# Patient Record
Sex: MALE | Race: WHITE | NOT HISPANIC OR LATINO | Employment: OTHER | URBAN - METROPOLITAN AREA
[De-identification: names, ages, dates, MRNs, and addresses within clinical notes are randomized per-mention and may not be internally consistent; named-entity substitution may affect disease eponyms.]

---

## 2017-01-06 ENCOUNTER — PROCEDURE VISIT (OUTPATIENT)
Dept: OPHTHALMOLOGY | Facility: CLINIC | Age: 82
End: 2017-01-06
Payer: MEDICARE

## 2017-01-06 VITALS — DIASTOLIC BLOOD PRESSURE: 83 MMHG | HEART RATE: 70 BPM | SYSTOLIC BLOOD PRESSURE: 143 MMHG

## 2017-01-06 DIAGNOSIS — R06.09 DOE (DYSPNEA ON EXERTION): Primary | ICD-10-CM

## 2017-01-06 DIAGNOSIS — E78.49 OTHER HYPERLIPIDEMIA: ICD-10-CM

## 2017-01-06 DIAGNOSIS — G51.39 HEMIFACIAL SPASM: Primary | ICD-10-CM

## 2017-01-06 DIAGNOSIS — E03.8 OTHER SPECIFIED HYPOTHYROIDISM: ICD-10-CM

## 2017-01-06 DIAGNOSIS — Z12.5 SCREENING FOR PROSTATE CANCER: ICD-10-CM

## 2017-01-06 PROCEDURE — 99499 UNLISTED E&M SERVICE: CPT | Mod: S$GLB,,,

## 2017-01-06 PROCEDURE — 64612 DESTROY NERVE FACE MUSCLE: CPT | Mod: LT,S$GLB,,

## 2017-01-06 NOTE — PROGRESS NOTES
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         Assessment /Plan     For exam results, see Encounter Report.    Hemifacial spasm - Left Eye        I have reviewed the patient history,review of systems,and findings as recorded by the technician and resident and accept.  botox injected in prior pattern,avoiding lower lid to prevent strabismus. Restarted with 2.5 units to minimize complications  (may need to increase -- discussed)

## 2017-01-06 NOTE — MR AVS SNAPSHOT
[facility]  [address]  [facility] [address]  Phone: [phone]  Fax: [phone]                  Asaf TORREY Pendleton   2017 10:30 AM   Procedure visit    Description:  Male : 3/17/1924   Provider:  Narendra Walker MD   Department:  [facility]           Reason for Visit     Eye Problem           Diagnoses this Visit        Comments    Hemifacial spasm    -  Primary            To Do List           Future Appointments        Provider Department Dept Phone    2017 8:45 AM LAB, APPOINTMENT [place] [facility] [phone]    2017 9:45 AM [facility] [id number] LIMIT [facility] [phone]    2017 10:15 AM EKG, APPT [facility] - [phone]    2017 11:00 AM MD [facility] [phone]      Goals (5 Years of Data)     None      [facility] On Call     [facility] On Call Nurse Care Line -  Assistance  Registered nurses in the [facility] On Call Center provide clinical advisement, health education, appointment booking, and other advisory services.  Call for this free service at [phone].             Medications           Message regarding Medications     Verify the changes and/or additions to your medication regime listed below are the same as discussed with your clinician today.  If any of these changes or additions are incorrect, please notify your healthcare provider.        These medications were administered today        Dose Freq    onabotulinumtoxina injection 15 Units 15 Units Clinic/HOD 1 time    Sig: Inject 15 Units into the skin one time.    Class: Normal    Route: Subcutaneous           Verify that the below list of medications is an accurate representation of the medications you are currently taking.  If none reported, the list may be blank. If incorrect, please contact your healthcare provider. Carry this list with you in case of emergency.            Current Medications     albuterol (PROVENTIL) 2.5 mg /3 mL (0.083 %) nebulizer solution Take 2.5 mg by nebulization every 6 (six) hours as needed.      albuterol 90 mcg/actuation inhaler Inhale 2 puffs into the lungs every 4 (four) hours as needed for Wheezing or Shortness of Breath.    ibuprofen (ADVIL,MOTRIN) 600 MG tablet     testosterone (ANDROGEL) 20.25 mg/1.25 gram (1.62 %) GlPm USE AS DIRECTED    budesonide-formoterol 160-4.5 mcg (SYMBICORT) 160-4.5 mcg/actuation HFAA Inhale 2 puffs into the lungs 2 (two) times daily.           Clinical Reference Information           Vital Signs - Last Recorded  Most recent update: 1/6/2017 10:49 AM by Nimco Grider    BP Pulse                (!) 143/83 (BP Location: Left arm, Patient Position: Sitting, BP Method: Automatic) 70          Blood Pressure          Most Recent Value    BP  (!)  143/83      Allergies as of 1/6/2017     Codeine      Immunizations Administered on Date of Encounter - 1/6/2017     None      [facility] Sign-Up     Activating your [facility] account is as easy as 1-2-3!     1) Visit [email or web address], select Sign Up Now, enter this activation code and your date of birth, then select Next.  [email or web address]  Expires: 1/28/2017 11:36 AM      2) Create a username and password to use when you visit [facility] in the future and select a security question in case you lose your password and select Next.    3) Enter your e-mail address and click Sign Up!    Additional Information  If you have questions, please e-mail [email or web address] or call [phone] to talk to our [facility] staff. Remember, [facility] is NOT to be used for urgent needs. For medical emergencies, dial 911.         Instructions    Return when effect wears off for repeat treatment

## 2017-01-23 ENCOUNTER — CLINICAL SUPPORT (OUTPATIENT)
Dept: AUDIOLOGY | Facility: CLINIC | Age: 82
End: 2017-01-23
Payer: MEDICARE

## 2017-01-23 DIAGNOSIS — H90.3 SENSORINEURAL HEARING LOSS, BILATERAL: Primary | ICD-10-CM

## 2017-01-23 PROCEDURE — 99499 UNLISTED E&M SERVICE: CPT | Mod: S$GLB,,, | Performed by: AUDIOLOGIST

## 2017-01-23 NOTE — PROGRESS NOTES
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  He fell down on the stairs after trying to stop his dog from going outside.  When he fell, he fell on his left ear and cracked the aid.  [facility] was called and they stated the aid was not under warranty but they did have his MYRNA on file.  His broken aid will be sent back for a repair/remake.  He was quoted $500 for it to get fixed.  This will come with a one yr warranty.

## 2017-01-25 ENCOUNTER — HOSPITAL ENCOUNTER (OUTPATIENT)
Dept: CARDIOLOGY | Facility: CLINIC | Age: 82
Discharge: HOME | End: 2017-01-25
Payer: MEDICARE

## 2017-01-25 ENCOUNTER — HOSPITAL ENCOUNTER (OUTPATIENT)
Dept: RADIOLOGY | Facility: HOSPITAL | Age: 82
Discharge: HOME | End: 2017-01-25
Attending: INTERNAL MEDICINE
Payer: MEDICARE

## 2017-01-25 ENCOUNTER — OFFICE VISIT (OUTPATIENT)
Dept: PULMONOLOGY | Facility: CLINIC | Age: 82
End: 2017-01-25
Payer: MEDICARE

## 2017-01-25 VITALS
HEART RATE: 90 BPM | BODY MASS INDEX: 28.43 KG/M2 | OXYGEN SATURATION: 97 % | DIASTOLIC BLOOD PRESSURE: 72 MMHG | HEIGHT: 71 IN | WEIGHT: 203.06 LBS | SYSTOLIC BLOOD PRESSURE: 114 MMHG

## 2017-01-25 DIAGNOSIS — R06.09 DOE (DYSPNEA ON EXERTION): ICD-10-CM

## 2017-01-25 DIAGNOSIS — Z00.00 ANNUAL PHYSICAL EXAM: Primary | ICD-10-CM

## 2017-01-25 PROCEDURE — 93000 ELECTROCARDIOGRAM COMPLETE: CPT | Mod: S$GLB,,, | Performed by: INTERNAL MEDICINE

## 2017-01-25 PROCEDURE — 99499 UNLISTED E&M SERVICE: CPT | Mod: S$GLB,,, | Performed by: INTERNAL MEDICINE

## 2017-01-25 PROCEDURE — 99397 PER PM REEVAL EST PAT 65+ YR: CPT | Mod: S$GLB,,, | Performed by: INTERNAL MEDICINE

## 2017-01-25 PROCEDURE — 99999 PR PBB SHADOW E&M-EST. PATIENT-LVL III: CPT | Mod: PBBFAC,,, | Performed by: INTERNAL MEDICINE

## 2017-01-25 PROCEDURE — 71020 XR CHEST PA AND LATERAL: CPT | Mod: 26,,, | Performed by: RADIOLOGY

## 2017-01-25 NOTE — LETTER
January 26, 2017    Asaf TORREY Pendleton  [address] [facility] [address]             [facility]  [address]  [facility] [address]  Phone: [phone] Dear Claudio,      Thank you for allowing me to serve you and perform your Executive [facility] exam on 1/25/2017. This letter will serve as a brief summary of the physical findings and laboratory/studies performed and recommendations at this time.Today's assessment is essentially normal. There has been a decline in renal function consisting with aging.      I will speak to Elijah Staples regarding the need for a colonoscope.         If you have any questions or concerns, please don't hesitate to call.    Sincerely,        Asaf Aguirre MD

## 2017-01-26 NOTE — PROGRESS NOTES
Subjective:       Patient ID: Asaf Pendleton is a 89 y.o. male.    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 Went to [place] and later served in the [employer] He has a son who is a distinguished ophthalmologist at [employer]. He has chronic back pain from spinal stenosis but still functions at a high level. He has been treated by Dr. Leach for prostate cancer with no evidence of reoccurrence.   Has very mild intermittent ashtma, doing well at the moment. Has noted a slight change in his bowel habits. No melena, no abdominal discomfort and no anemia on today's labs; Has a colonoscope in December, 2008, negative. Will discuss with Dr. Staples about the need of a repeat study now.       No flowsheet data found.]  Review of Systems   Constitutional: Negative.    HENT: Positive for hearing loss.    Eyes: Negative.         Left facial tic   Respiratory: Negative.    Cardiovascular: Negative.    Genitourinary: Negative.         S/P irradiation for colon cancer.   Musculoskeletal: Positive for back pain.        Severe spinal stenosis   Skin: Negative.    Gastrointestinal: Negative.         Change in bowel habits.   Neurological: Negative.    Psychiatric/Behavioral: Negative.        Objective:      Physical Exam   Constitutional: He is oriented to person, place, and time. He appears well-developed and well-nourished.   HENT:   Head: Normocephalic and atraumatic.   Right Ear: External ear normal.   Left Ear: External ear normal.   Eyes: Conjunctivae and EOM are normal. Pupils are equal, round, and reactive to light.   Neck: Normal range of motion. Neck supple.   Cardiovascular: Normal rate, regular rhythm and normal heart sounds.    Pulmonary/Chest: Effort normal and breath sounds normal.   Clear with peak flow of 450 l/min   Abdominal: Soft. Bowel sounds are normal.   Musculoskeletal: Normal range of motion.   Neurological: He is alert and  oriented to person, place, and time. He has normal reflexes.   Skin: Skin is warm and dry.   Psychiatric: He has a normal mood and affect. His behavior is normal. Judgment and thought content normal.           Assessment:       No diagnosis found.    Outpatient Encounter Prescriptions as of 1/25/2017   Medication Sig Dispense Refill    albuterol (PROVENTIL) 2.5 mg /3 mL (0.083 %) nebulizer solution Take 2.5 mg by nebulization every 6 (six) hours as needed.        albuterol 90 mcg/actuation inhaler Inhale 2 puffs into the lungs every 4 (four) hours as needed for Wheezing or Shortness of Breath. 1 Inhaler 12    budesonide-formoterol 160-4.5 mcg (SYMBICORT) 160-4.5 mcg/actuation HFAA Inhale 2 puffs into the lungs 2 (two) times daily. 1 Inhaler 12    ibuprofen (ADVIL,MOTRIN) 600 MG tablet       testosterone (ANDROGEL) 20.25 mg/1.25 gram (1.62 %) GlPm USE AS DIRECTED 1 Bottle 4     No facility-administered encounter medications on file as of 1/25/2017.      No orders of the defined types were placed in this encounter.    Plan:       Labs: Depressed GFR and increased creatinine:1.9, all other parameters are normal. Chest x-ray is clear and EKG has intraventricular delay otherwise normal and unchanged from 2015. Remarkalble 89 old Male

## 2017-02-16 ENCOUNTER — CLINICAL SUPPORT (OUTPATIENT)
Dept: AUDIOLOGY | Facility: CLINIC | Age: 82
End: 2017-02-16
Payer: MEDICARE

## 2017-02-16 DIAGNOSIS — H90.3 SENSORINEURAL HEARING LOSS, BILATERAL: Primary | ICD-10-CM

## 2017-02-16 PROCEDURE — 99499 UNLISTED E&M SERVICE: CPT | Mod: S$GLB,,, | Performed by: AUDIOLOGIST

## 2017-02-16 NOTE — PROGRESS NOTES
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  In addition, he requested an "ambient"  Program, which he means a comfort program.  This program was placed into P2.  He now has Master and comfort and an RC dex to control volume and program adjustments.  His ear canals were clear of wax.  PRN.  "

## 2017-02-22 ENCOUNTER — TELEPHONE (OUTPATIENT)
Dept: DERMATOLOGY | Facility: CLINIC | Age: 82
End: 2017-02-22

## 2017-02-22 NOTE — TELEPHONE ENCOUNTER
----- Message from Rosalinda Brown sent at 2/22/2017  3:56 PM CST -----  Contact: pt  The pt called to get an appointment ASAP. The pt will come in with non healing lesions on his face. Please call the pt at [phone]

## 2017-03-01 ENCOUNTER — OFFICE VISIT (OUTPATIENT)
Dept: OPHTHALMOLOGY | Facility: CLINIC | Age: 82
End: 2017-03-01
Payer: MEDICARE

## 2017-03-01 DIAGNOSIS — H35.30 MACULAR DEGENERATION: Primary | ICD-10-CM

## 2017-03-01 DIAGNOSIS — H50.40 COMITANT STRABISMUS: ICD-10-CM

## 2017-03-01 DIAGNOSIS — G51.39 HEMIFACIAL SPASM: ICD-10-CM

## 2017-03-01 DIAGNOSIS — Z96.1 PSEUDOPHAKIA OF BOTH EYES: ICD-10-CM

## 2017-03-01 PROCEDURE — 92014 COMPRE OPH EXAM EST PT 1/>: CPT | Mod: S$GLB,,,

## 2017-03-01 PROCEDURE — 99999 PR PBB SHADOW E&M-EST. PATIENT-LVL II: CPT | Mod: PBBFAC,,,

## 2017-03-01 NOTE — PROGRESS NOTES
HPI     Eye Problem    Additional comments: os feels jumpy           Comments   ARMD OU  Hemifacial spasm  S/p botox injection 1/6/17       Last edited by Nimco Grider on 3/1/2017 11:33 AM. (History)            Assessment /Plan     For exam results, see Encounter Report.    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  ARMD slowly progressive -- cont supplements. Rx for new glasses

## 2017-03-01 NOTE — PATIENT INSTRUCTIONS
Astigmatism worse -- get new glasses.  Continue supplements for macular degeneration  Return for botox as needed

## 2017-03-01 NOTE — MR AVS SNAPSHOT
[facility]  [address]  [facility] [address]  Phone: [phone]  Fax: [phone]                  Asaf Pendleton   3/1/2017 10:30 AM   Office Visit    Description:  Male : 3/17/1924   Provider:  Narendra Walker MD   Department:  [facility]           Reason for Visit     Eye Problem           Diagnoses this Visit        Comments    Macular degeneration    -  Primary     Pseudophakia of both eyes         Hemifacial spasm         Comitant strabismus                To Do List           Goals (5 Years of Data)     None      [facility] On [facility]     [facility] On Call Nurse Care Line -  Assistance  Registered nurses in the [facility] On Call Center provide clinical advisement, health education, appointment booking, and other advisory services.  Call for this free service at [phone].             Medications           Message regarding Medications     Verify the changes and/or additions to your medication regime listed below are the same as discussed with your clinician today.  If any of these changes or additions are incorrect, please notify your healthcare provider.             Verify that the below list of medications is an accurate representation of the medications you are currently taking.  If none reported, the list may be blank. If incorrect, please contact your healthcare provider. Carry this list with you in case of emergency.           Current Medications     albuterol (PROVENTIL) 2.5 mg /3 mL (0.083 %) nebulizer solution Take 2.5 mg by nebulization every 6 (six) hours as needed.      albuterol 90 mcg/actuation inhaler Inhale 2 puffs into the lungs every 4 (four) hours as needed for Wheezing or Shortness of Breath.    ibuprofen (ADVIL,MOTRIN) 600 MG tablet     testosterone (ANDROGEL) 20.25 mg/1.25 gram (1.62 %) GlPm USE AS DIRECTED    budesonide-formoterol 160-4.5 mcg (SYMBICORT) 160-4.5 mcg/actuation HFAA Inhale 2 puffs into the lungs 2 (two) times daily.            Clinical Reference Information           Allergies as of 3/1/2017     Codeine      Immunizations Administered on Date of Encounter - 3/1/2017     None      Instructions    Astigmatism worse -- get new glasses.  Continue supplements for macular degeneration  Return for botox as needed       Language Assistance Services     ATTENTION: Language assistance services are available, free of charge. Please call [phone].      ATENCIÓN: Si habla yanetañol, tiene a escalante disposición servicios gratuitos de asistencia lingüística. Llame al [phone].     CHÚ Ý: N?u b?n nói Ti?ng Vi?t, có các d?ch v? h? tr? ngôn ng? mi?n phí dành cho b?n. G?i s? [phone].         [facility] complies with applicable Federal civil rights laws and does not discriminate on the basis of race, color, national origin, age, disability, or sex.

## 2017-03-02 DIAGNOSIS — R79.89 LOW TESTOSTERONE: ICD-10-CM

## 2017-03-02 RX ORDER — TESTOSTERONE 20.25 MG/1.25G
GEL TOPICAL
Qty: 1 BOTTLE | Refills: 4 | Status: SHIPPED | OUTPATIENT
Start: 2017-03-02 | End: 2017-04-28 | Stop reason: SDUPTHER

## 2017-03-07 ENCOUNTER — TELEPHONE (OUTPATIENT)
Dept: DERMATOLOGY | Facility: CLINIC | Age: 82
End: 2017-03-07

## 2017-03-07 NOTE — TELEPHONE ENCOUNTER
----- Message from Maryellen Garcia MD sent at 3/7/2017  9:52 AM CST -----  Contact: pt  You can give him 10 am appt on 3/9  ----- Message -----     From: Vandana Burden MA     Sent: 2/22/2017   4:18 PM       To: MD Dr. Jose June: Dr. Capellan stated he need to see you as soon as possible I inform to him you do not have anything in FEB but he insisted on me telling you who he is, There's no spots in March only yellow and green A's and B's.    ----- Message -----     From: Rosalinda Brown     Sent: 2/22/2017   3:56 PM       To: Jose Monroy    The pt called to get an appointment ASAP. The pt will come in with non healing lesions on his face. Please call the pt at [phone]

## 2017-03-13 ENCOUNTER — TELEPHONE (OUTPATIENT)
Dept: DERMATOLOGY | Facility: CLINIC | Age: 82
End: 2017-03-13

## 2017-03-13 NOTE — TELEPHONE ENCOUNTER
----- Message from Flores Michael sent at 3/13/2017  1:05 PM CDT -----  Contact: pt   MCR-pt-pt is returning the nurses call can you please call pt at [phone].    KEILA

## 2017-04-28 DIAGNOSIS — R79.89 LOW TESTOSTERONE: ICD-10-CM

## 2017-04-28 RX ORDER — TESTOSTERONE 20.25 MG/1.25G
GEL TOPICAL
Qty: 1 BOTTLE | Refills: 4 | Status: SHIPPED | OUTPATIENT
Start: 2017-04-28 | End: 2017-07-21 | Stop reason: SDUPTHER

## 2017-05-23 ENCOUNTER — TELEPHONE (OUTPATIENT)
Dept: DERMATOLOGY | Facility: CLINIC | Age: 82
End: 2017-05-23

## 2017-05-23 NOTE — TELEPHONE ENCOUNTER
----- Message from Vandana Simms sent at 5/22/2017  1:54 PM CDT -----  Contact: patient   [phone] call above patient need to get in as soon as possible waiting on a call from the nurse thanks

## 2017-05-26 ENCOUNTER — OFFICE VISIT (OUTPATIENT)
Dept: DERMATOLOGY | Facility: CLINIC | Age: 82
End: 2017-05-26
Payer: MEDICARE

## 2017-05-26 ENCOUNTER — TELEPHONE (OUTPATIENT)
Dept: OPHTHALMOLOGY | Facility: CLINIC | Age: 82
End: 2017-05-26

## 2017-05-26 VITALS — BODY MASS INDEX: 28.31 KG/M2 | WEIGHT: 203 LBS

## 2017-05-26 DIAGNOSIS — Z85.828 HISTORY OF SKIN CANCER: ICD-10-CM

## 2017-05-26 DIAGNOSIS — L57.0 ACTINIC KERATOSIS: Primary | ICD-10-CM

## 2017-05-26 PROCEDURE — 99999 PR PBB SHADOW E&M-EST. PATIENT-LVL II: CPT | Mod: PBBFAC,,, | Performed by: DERMATOLOGY

## 2017-05-26 PROCEDURE — 1159F MED LIST DOCD IN RCRD: CPT | Mod: S$GLB,,, | Performed by: DERMATOLOGY

## 2017-05-26 PROCEDURE — 17000 DESTRUCT PREMALG LESION: CPT | Mod: S$GLB,,, | Performed by: DERMATOLOGY

## 2017-05-26 PROCEDURE — 99213 OFFICE O/P EST LOW 20 MIN: CPT | Mod: 25,S$GLB,, | Performed by: DERMATOLOGY

## 2017-05-26 NOTE — PROGRESS NOTES
Subjective:       Patient ID:  Asaf Pendleton is a 89 y.o. male who presents for   Chief Complaint   Patient presents with    Spot     face    Skin Check     History of Present Illness: The patient presents with chief complaint of lesion.  Location: right cheek  Duration: months  Signs/Symptoms: growing     Prior treatments: none        Spot         Review of Systems   Constitutional: Negative for fever.   Skin: Negative for itching and rash.   Hematologic/Lymphatic: Does not bruise/bleed easily.        Objective:    Physical Exam   Constitutional: He appears well-developed and well-nourished. No distress.   Neurological: He is alert and oriented to person, place, and time. He is not disoriented.   Psychiatric: He has a normal mood and affect.   Skin:   Areas Examined (abnormalities noted in diagram):   Scalp / Hair Palpated and Inspected  Head / Face Inspection Performed  Neck Inspection Performed  Chest / Axilla Inspection Performed  RUE Inspected  LUE Inspection Performed                   Diagram Legend     Erythematous scaling macule/papule c/w actinic keratosis         Assessment / Plan:        Actinic keratosis   Cryosurgery Procedure Note    Verbal consent from the patient is obtained and the patient is aware of the precancerous quality and need for treatment of these lesions. Liquid nitrogen cryosurgery is applied to the 1 actinic keratoses, as detailed in the physical exam, to produce a freeze injury.      History of skin cancer  Comments:  scc forehead removed mohs surgery      Dry skin on face, use am lactin lotion qd           Return in about 6 months (around 11/26/2017).

## 2017-05-26 NOTE — TELEPHONE ENCOUNTER
Left message Mr Capellan is asleep she will have [facility] call back later today. He needs an appt. For blurred vision

## 2017-07-11 ENCOUNTER — HOSPITAL ENCOUNTER (OUTPATIENT)
Dept: RADIOLOGY | Facility: HOSPITAL | Age: 82
Discharge: HOME | End: 2017-07-11
Attending: INTERNAL MEDICINE
Payer: MEDICARE

## 2017-07-11 ENCOUNTER — OFFICE VISIT (OUTPATIENT)
Dept: PULMONOLOGY | Facility: CLINIC | Age: 82
End: 2017-07-11
Payer: MEDICARE

## 2017-07-11 VITALS
WEIGHT: 201 LBS | BODY MASS INDEX: 29.77 KG/M2 | HEIGHT: 69 IN | HEART RATE: 84 BPM | SYSTOLIC BLOOD PRESSURE: 118 MMHG | OXYGEN SATURATION: 97 % | DIASTOLIC BLOOD PRESSURE: 72 MMHG

## 2017-07-11 DIAGNOSIS — S22.31XA CLOSED FRACTURE OF RIB OF RIGHT SIDE, INITIAL ENCOUNTER: Primary | ICD-10-CM

## 2017-07-11 DIAGNOSIS — S22.31XA CLOSED FRACTURE OF RIB OF RIGHT SIDE, INITIAL ENCOUNTER: ICD-10-CM

## 2017-07-11 PROCEDURE — 99214 OFFICE O/P EST MOD 30 MIN: CPT | Mod: S$GLB,,, | Performed by: INTERNAL MEDICINE

## 2017-07-11 PROCEDURE — 99999 PR PBB SHADOW E&M-EST. PATIENT-LVL III: CPT | Mod: PBBFAC,,, | Performed by: INTERNAL MEDICINE

## 2017-07-11 PROCEDURE — 71100 X-RAY EXAM RIBS UNI 2 VIEWS: CPT | Mod: 26,,, | Performed by: RADIOLOGY

## 2017-07-11 PROCEDURE — 1159F MED LIST DOCD IN RCRD: CPT | Mod: S$GLB,,, | Performed by: INTERNAL MEDICINE

## 2017-07-11 PROCEDURE — 71100 X-RAY EXAM RIBS UNI 2 VIEWS: CPT | Mod: TC

## 2017-07-11 PROCEDURE — 1125F AMNT PAIN NOTED PAIN PRSNT: CPT | Mod: S$GLB,,, | Performed by: INTERNAL MEDICINE

## 2017-07-11 RX ORDER — HYDROCODONE BITARTRATE AND ACETAMINOPHEN 10; 325 MG/1; MG/1
TABLET ORAL
Qty: 36 TABLET | Refills: 0 | Status: SHIPPED | OUTPATIENT
Start: 2017-07-11 | End: 2017-09-26

## 2017-07-11 NOTE — PROGRESS NOTES
Subjective:       Patient ID: Asaf Pendleton is a 89 y.o. male.    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 He has exquisite tenderness over the right posterior axillary line.No hematoma or subcutaneous air.      No flowsheet data found.]  Review of Systems   Constitutional: Negative.    HENT: Negative.    Eyes: Negative.    Respiratory: Negative.    Cardiovascular: Positive for chest pain.   Genitourinary: Negative.    Musculoskeletal: Negative.    Skin: Negative.    Gastrointestinal: Negative.    Neurological: Negative.    Psychiatric/Behavioral: Negative.        Objective:      Physical Exam   Pulmonary/Chest:   Pain with palpation of the right anterior axillary line. No crepitation or ecchymoses           Assessment:       1. Closed fracture of rib of right side, initial encounter        Outpatient Encounter Prescriptions as of 7/11/2017   Medication Sig Dispense Refill    albuterol (PROVENTIL) 2.5 mg /3 mL (0.083 %) nebulizer solution Take 2.5 mg by nebulization every 6 (six) hours as needed.        albuterol 90 mcg/actuation inhaler Inhale 2 puffs into the lungs every 4 (four) hours as needed for Wheezing or Shortness of Breath. 1 Inhaler 12    budesonide-formoterol 160-4.5 mcg (SYMBICORT) 160-4.5 mcg/actuation HFAA Inhale 2 puffs into the lungs 2 (two) times daily. 1 Inhaler 12    hydrocodone-acetaminophen 10-325mg (NORCO)  mg Tab One tablet  q 4 -6 hours 36 tablet 0    ibuprofen (ADVIL,MOTRIN) 600 MG tablet       testosterone (ANDROGEL) 20.25 mg/1.25 gram (1.62 %) GlPm USE AS DIRECTED 1 Bottle 4     No facility-administered encounter medications on file as of 7/11/2017.      Orders Placed This Encounter   Procedures    X-Ray Ribs 2 View Right     Standing Status:   Future     Number of Occurrences:   1     Standing Expiration Date:   7/11/2018      Order Specific Question:   May the Radiologist modify the order per protocol to meet the clinical needs of the patient?     Answer:   Yes     Plan:            X-ray : 4 rib fractures: 6,7 ,8,9  Will give vicodin for pain management.

## 2017-07-21 DIAGNOSIS — R79.89 LOW TESTOSTERONE: ICD-10-CM

## 2017-07-21 RX ORDER — TESTOSTERONE 20.25 MG/1.25G
GEL TOPICAL
Qty: 1 BOTTLE | Refills: 4 | Status: SHIPPED | OUTPATIENT
Start: 2017-07-21 | End: 2017-09-15 | Stop reason: SDUPTHER

## 2017-08-23 ENCOUNTER — OFFICE VISIT (OUTPATIENT)
Dept: OPHTHALMOLOGY | Facility: CLINIC | Age: 82
End: 2017-08-23
Payer: MEDICARE

## 2017-08-23 DIAGNOSIS — H35.3190 MACULAR DEGENERATION, AGE RELATED, NONEXUDATIVE: ICD-10-CM

## 2017-08-23 DIAGNOSIS — H50.40 COMITANT STRABISMUS: ICD-10-CM

## 2017-08-23 DIAGNOSIS — G51.39 HEMIFACIAL SPASM: Primary | ICD-10-CM

## 2017-08-23 DIAGNOSIS — Z96.1 PSEUDOPHAKIA OF BOTH EYES: ICD-10-CM

## 2017-08-23 DIAGNOSIS — H35.30 MACULAR DEGENERATION: ICD-10-CM

## 2017-08-23 PROCEDURE — 99999 PR PBB SHADOW E&M-EST. PATIENT-LVL II: CPT | Mod: PBBFAC,,, | Performed by: OPHTHALMOLOGY

## 2017-08-23 PROCEDURE — 92014 COMPRE OPH EXAM EST PT 1/>: CPT | Mod: S$GLB,,, | Performed by: OPHTHALMOLOGY

## 2017-08-23 PROCEDURE — 92134 CPTRZ OPH DX IMG PST SGM RTA: CPT | Mod: S$GLB,,, | Performed by: OPHTHALMOLOGY

## 2017-08-23 NOTE — PROGRESS NOTES
HPI     Concerns About Ocular Health    Additional comments: Krystina Denise PT           Comments   ARMD OU  Hemifacial spasm  S/p botox injection 1/6/17    Patient states his va at near seems to be getting worse in OU since his   last visit.    No gtts       Last edited by Arben Ibarra on 8/23/2017 10:31 AM. (History)            Assessment /Plan     For exam results, see Encounter Report.    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  Patient to return sooner for changes with chart including decreasing vision and increasing metamorphopsia.     Clinic      Dry Eye Syndrome: discussed use of warm compresses, preserved & non-preserved artificial tears, gel and PM ointment options.  Also discussed options utilizing medications.    PC IOL OU  Quiet    Plan   Clinic  Retina service  Dr Brenner --> Bot clinic  Shelia 6 months IOP  Sooner prn with good understanding

## 2017-09-11 ENCOUNTER — OFFICE VISIT (OUTPATIENT)
Dept: OPTOMETRY | Facility: CLINIC | Age: 82
End: 2017-09-11
Payer: MEDICARE

## 2017-09-11 DIAGNOSIS — H54.3 LOW VISION, BOTH EYES: Primary | ICD-10-CM

## 2017-09-11 PROCEDURE — 92015 DETERMINE REFRACTIVE STATE: CPT | Mod: S$GLB,,, | Performed by: OPTOMETRIST

## 2017-09-11 PROCEDURE — 99499 UNLISTED E&M SERVICE: CPT | Mod: S$GLB,,, | Performed by: OPTOMETRIST

## 2017-09-11 PROCEDURE — 99999 PR PBB SHADOW E&M-EST. PATIENT-LVL II: CPT | Mod: PBBFAC,,, | Performed by: OPTOMETRIST

## 2017-09-11 NOTE — PROGRESS NOTES
Assessment /Plan     For exam results, see Encounter Report.    Low vision, both eyes      1. See other exam low vision same date.

## 2017-09-15 DIAGNOSIS — R79.89 LOW TESTOSTERONE: ICD-10-CM

## 2017-09-15 RX ORDER — TESTOSTERONE 20.25 MG/1.25G
GEL TOPICAL
Qty: 1 BOTTLE | Refills: 4 | Status: SHIPPED | OUTPATIENT
Start: 2017-09-15 | End: 2017-10-30 | Stop reason: SDUPTHER

## 2017-09-26 ENCOUNTER — OFFICE VISIT (OUTPATIENT)
Dept: PULMONOLOGY | Facility: CLINIC | Age: 82
End: 2017-09-26
Payer: MEDICARE

## 2017-09-26 ENCOUNTER — INITIAL CONSULT (OUTPATIENT)
Dept: OPHTHALMOLOGY | Facility: CLINIC | Age: 82
End: 2017-09-26
Payer: MEDICARE

## 2017-09-26 VITALS
OXYGEN SATURATION: 93 % | HEART RATE: 66 BPM | SYSTOLIC BLOOD PRESSURE: 126 MMHG | HEIGHT: 70 IN | BODY MASS INDEX: 29.13 KG/M2 | WEIGHT: 203.5 LBS | DIASTOLIC BLOOD PRESSURE: 72 MMHG

## 2017-09-26 DIAGNOSIS — E05.90 HYPERTHYROIDISM: ICD-10-CM

## 2017-09-26 DIAGNOSIS — D64.9 ANEMIA, UNSPECIFIED TYPE: Primary | ICD-10-CM

## 2017-09-26 DIAGNOSIS — J45.20 ASTHMA, MILD INTERMITTENT, WELL-CONTROLLED: Primary | ICD-10-CM

## 2017-09-26 DIAGNOSIS — G24.5 BLEPHAROSPASM: Primary | ICD-10-CM

## 2017-09-26 DIAGNOSIS — G51.39 HEMIFACIAL SPASM: ICD-10-CM

## 2017-09-26 DIAGNOSIS — Z96.1 PSEUDOPHAKIA OF BOTH EYES: ICD-10-CM

## 2017-09-26 DIAGNOSIS — H35.30 MACULAR DEGENERATION: ICD-10-CM

## 2017-09-26 PROCEDURE — 99214 OFFICE O/P EST MOD 30 MIN: CPT | Mod: S$GLB,,, | Performed by: INTERNAL MEDICINE

## 2017-09-26 PROCEDURE — 1126F AMNT PAIN NOTED NONE PRSNT: CPT | Mod: S$GLB,,, | Performed by: INTERNAL MEDICINE

## 2017-09-26 PROCEDURE — 99999 PR PBB SHADOW E&M-EST. PATIENT-LVL III: CPT | Mod: PBBFAC,,, | Performed by: INTERNAL MEDICINE

## 2017-09-26 PROCEDURE — 99999 PR PBB SHADOW E&M-EST. PATIENT-LVL II: CPT | Mod: PBBFAC,,, | Performed by: OPHTHALMOLOGY

## 2017-09-26 PROCEDURE — 1159F MED LIST DOCD IN RCRD: CPT | Mod: S$GLB,,, | Performed by: INTERNAL MEDICINE

## 2017-09-26 PROCEDURE — 3008F BODY MASS INDEX DOCD: CPT | Mod: S$GLB,,, | Performed by: INTERNAL MEDICINE

## 2017-09-26 PROCEDURE — 92012 INTRM OPH EXAM EST PATIENT: CPT | Mod: 25,S$GLB,, | Performed by: OPHTHALMOLOGY

## 2017-09-26 PROCEDURE — 64612 DESTROY NERVE FACE MUSCLE: CPT | Mod: LT,S$GLB,, | Performed by: OPHTHALMOLOGY

## 2017-09-26 NOTE — PROGRESS NOTES
HPI     Eye Problem    Additional comments: hemifacial spasm           Comments   Here for botox injection left side. Last injection 1/6/17 Dr Walker  Last visit with Dr Bass  Pseudophakia  ARMD dry   No drops       Last edited by Nimco Grider on 9/26/2017 11:15 AM. (History)            Assessment /Plan     For exam results, see Encounter Report.    Blepharospasm  -     Cancel: External/Slit Lamp Photography    Hemifacial spasm  -     Discontinue: onabotulinumtoxinA injection; Inject into the muscle one time.  -     Prior Authorization Order  -     onabotulinumtoxinA injection 50 Units; Inject 50 Units into the muscle one time.    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  Alternatives such as waiting were discussed.  All questions were answered.           Procedure note:     32.5 units injected, 17.5 units wasted. The patient tolerated the procedure well without any complications. See scanned note for injection pattern.       RTC 3 months     AMD followed by Dr. Bass.      I have reviewed and concur with the resident's history, physical, assessment, and plan.  I have personally interviewed and examined the patient.

## 2017-09-27 NOTE — PROGRESS NOTES
Subjective:       Patient ID: Asaf Pendleton is a 89 y.o. male.    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 He states that he is cold all of the time and even had a jacket He is doing remarkably  well, has worsening vision and can't read the small print and does not drive any more.today. Has reduced his tie in the wood shop.       No flowsheet data found.]  Review of Systems   Constitutional: Negative.    HENT: Negative.    Eyes: Negative.         Macular degeneration      botox injection for blephospasm   Respiratory: Negative.    Cardiovascular: Negative.    Genitourinary: Negative.         S/P prostate cancer   Musculoskeletal: Positive for back pain.        S/P lumbar surgery    Skin: Negative.    Gastrointestinal: Negative.    Neurological: Negative.    Psychiatric/Behavioral: Negative.        Objective:      Physical Exam   Constitutional: He is oriented to person, place, and time. He appears well-developed and well-nourished.   HENT:   Head: Normocephalic and atraumatic.   Right Ear: External ear normal.   Left Ear: External ear normal.   Eyes: Conjunctivae and EOM are normal. Pupils are equal, round, and reactive to light.   Neck: Normal range of motion. Neck supple.   Cardiovascular: Normal rate, regular rhythm and normal heart sounds.    /76    2/6 systolic murmur 2nd RICS   Pulmonary/Chest: Effort normal and breath sounds normal.   Peak flow 400 l/min without wheezes   Abdominal: Soft. Bowel sounds are normal.   Musculoskeletal: Normal range of motion.   Neurological: He is alert and oriented to person, place, and time. He has normal reflexes.   Skin: Skin is warm and dry.   Psychiatric: He has a normal mood and affect. His behavior is normal. Judgment and thought content normal.           Assessment:       No diagnosis found.    Outpatient Encounter Prescriptions as of 9/26/2017   Medication Sig  Dispense Refill    albuterol (PROVENTIL) 2.5 mg /3 mL (0.083 %) nebulizer solution Take 2.5 mg by nebulization every 6 (six) hours as needed.        albuterol 90 mcg/actuation inhaler Inhale 2 puffs into the lungs every 4 (four) hours as needed for Wheezing or Shortness of Breath. 1 Inhaler 12    budesonide-formoterol 160-4.5 mcg (SYMBICORT) 160-4.5 mcg/actuation HFAA Inhale 2 puffs into the lungs 2 (two) times daily. 1 Inhaler 12    ibuprofen (ADVIL,MOTRIN) 600 MG tablet       testosterone (ANDROGEL) 20.25 mg/1.25 gram (1.62 %) GlPm USE AS DIRECTED 1 Bottle 4    [DISCONTINUED] hydrocodone-acetaminophen 10-325mg (NORCO)  mg Tab One tablet  q 4 -6 hours 36 tablet 0     Facility-Administered Encounter Medications as of 9/26/2017   Medication Dose Route Frequency Provider Last Rate Last Dose    [COMPLETED] onabotulinumtoxinA injection 50 Units  50 Units Intramuscular 1 time in Clinic/ERIKA Brenner MD   50 Units at 09/26/17 1145    [DISCONTINUED] onabotulinumtoxinA injection   Intramuscular 1 time in Clinic/ERIKA Brenner MD         No orders of the defined types were placed in this encounter.    Plan:            CBC slightly reduced H&H with macrocytosis   TSH 6.02  And normal T-4:0.86   Euthyroid

## 2017-10-02 ENCOUNTER — INITIAL CONSULT (OUTPATIENT)
Dept: OPHTHALMOLOGY | Facility: CLINIC | Age: 82
End: 2017-10-02
Payer: MEDICARE

## 2017-10-02 VITALS — DIASTOLIC BLOOD PRESSURE: 66 MMHG | HEART RATE: 83 BPM | SYSTOLIC BLOOD PRESSURE: 115 MMHG

## 2017-10-02 DIAGNOSIS — H35.3134 NONEXUDATIVE AGE-RELATED MACULAR DEGENERATION, BILATERAL, ADVANCED ATROPHIC WITH SUBFOVEAL INVOLVEMENT: Primary | ICD-10-CM

## 2017-10-02 PROCEDURE — 92014 COMPRE OPH EXAM EST PT 1/>: CPT | Mod: 24,S$GLB,, | Performed by: OPHTHALMOLOGY

## 2017-10-02 PROCEDURE — 99999 PR PBB SHADOW E&M-EST. PATIENT-LVL III: CPT | Mod: PBBFAC,,, | Performed by: OPHTHALMOLOGY

## 2017-10-02 NOTE — LETTER
October 2, 2017      Bala Bass MD  [address]  [facility] [address]           [facility]  [address]  [facility] [address]  Phone: [phone]  Fax: [phone]          Patient: Asaf Pendleton   MR Number: [id number]   YOB: 1924   Date of Visit: 10/2/2017       Dear Dr. Bala Bass:    Thank you for referring Asaf Pendleton to me for evaluation. Attached you will find relevant portions of my assessment and plan of care.    If you have questions, please do not hesitate to call me. I look forward to following Asaf Pendleton along with you.    Sincerely,    Terrence Aj MD    Enclosure  CC:  No Recipients    If you would like to receive this communication electronically, please contact [email or web address] or [phone] to request more information on [employer] Link access.    For providers and/or their staff who would like to refer a patient to [facility], please contact us through our one-stop-shop provider referral line, [facility], at [phone].    If you feel you have received this communication in error or would no longer like to receive these types of communications, please e-mail [email or web address]

## 2017-10-02 NOTE — PROGRESS NOTES
HPI     Mac Degen consult   DLS-08/23/2017 Dr. Bass    Has noticed decline in near va within the last 6 mo to a year.  Having   difficulty reading.  No pain/redness/f/f OU.  No sudden changes or   distortions.    Still suffering with blepharospasm's OS constantly     (-)Flashes (-)Floaters  (-)Photophobia  (-)Glare    ARMD OU  Hemifacial spasm  S/p botox injections      Last edited by Terrence Aj MD on 10/2/2017  2:17 PM. (History)      Reviewed last [place] SDOCT:   OD: good quality, RPE atrophy, no fluid  OS: good quality, RPE atrophy, no fluid      Assessment /Plan     For exam results, see Encounter Report.    There are no diagnoses linked to this encounter.    Adv Dry AMD OU with GARPE  Continue AREDS 2, Amsler  Refer to [place] or Dr Bernardo again for reading aids/magnifiers    Hemifacial spasm s/p recent Botox-f/u Dr Brenner as planned    PCIOL OU-excellent result    F/u Dr. Bass as planned    RTC 6 months, sooner PRN

## 2017-10-27 ENCOUNTER — TELEPHONE (OUTPATIENT)
Dept: PULMONOLOGY | Facility: CLINIC | Age: 82
End: 2017-10-27

## 2017-10-30 DIAGNOSIS — R79.89 LOW TESTOSTERONE: ICD-10-CM

## 2017-10-30 RX ORDER — TESTOSTERONE 20.25 MG/1.25G
GEL TOPICAL
Qty: 1 BOTTLE | Refills: 4 | Status: SHIPPED | OUTPATIENT
Start: 2017-10-30 | End: 2018-01-30 | Stop reason: SDUPTHER

## 2017-12-01 ENCOUNTER — TELEPHONE (OUTPATIENT)
Dept: PULMONOLOGY | Facility: CLINIC | Age: 82
End: 2017-12-01

## 2017-12-01 NOTE — TELEPHONE ENCOUNTER
----- Message from Halley Olivo sent at 11/24/2017  2:40 PM CST -----  Contact: [phone]   Calling to refer a patient

## 2017-12-14 ENCOUNTER — CLINICAL SUPPORT (OUTPATIENT)
Dept: AUDIOLOGY | Facility: CLINIC | Age: 82
End: 2017-12-14
Payer: MEDICARE

## 2017-12-14 DIAGNOSIS — H90.3 SENSORINEURAL HEARING LOSS, BILATERAL: Primary | ICD-10-CM

## 2017-12-14 PROCEDURE — 99499 UNLISTED E&M SERVICE: CPT | Mod: S$GLB,,, | Performed by: AUDIOLOGIST

## 2017-12-14 NOTE — PROGRESS NOTES
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  It was reprogrammed with his left aid and RC dex so that everything was in synch and working together.  Dr. Pendleton seemed pleased with the volume and didn't need any adjustments.  His right vent was plugged.  NV 6 months for HA check and possible audiogram and ear cleaning.

## 2018-01-30 DIAGNOSIS — R79.89 LOW TESTOSTERONE: ICD-10-CM

## 2018-01-30 RX ORDER — TESTOSTERONE 20.25 MG/1.25G
GEL TOPICAL
Qty: 1 BOTTLE | Refills: 4 | Status: SHIPPED | OUTPATIENT
Start: 2018-01-30 | End: 2018-08-01 | Stop reason: SDUPTHER

## 2018-04-19 ENCOUNTER — PES CALL (OUTPATIENT)
Dept: ADMINISTRATIVE | Facility: CLINIC | Age: 83
End: 2018-04-19

## 2018-05-22 ENCOUNTER — OFFICE VISIT (OUTPATIENT)
Dept: DERMATOLOGY | Facility: CLINIC | Age: 83
End: 2018-05-22
Payer: MEDICARE

## 2018-05-22 VITALS — WEIGHT: 203 LBS | BODY MASS INDEX: 29.13 KG/M2

## 2018-05-22 DIAGNOSIS — Z85.828 HISTORY OF SKIN CANCER: ICD-10-CM

## 2018-05-22 DIAGNOSIS — L57.0 MULTIPLE ACTINIC KERATOSES: Primary | ICD-10-CM

## 2018-05-22 PROCEDURE — 99999 PR PBB SHADOW E&M-EST. PATIENT-LVL III: CPT | Mod: PBBFAC,,, | Performed by: DERMATOLOGY

## 2018-05-22 PROCEDURE — 17003 DESTRUCT PREMALG LES 2-14: CPT | Mod: S$GLB,,, | Performed by: DERMATOLOGY

## 2018-05-22 PROCEDURE — 17000 DESTRUCT PREMALG LESION: CPT | Mod: S$GLB,,, | Performed by: DERMATOLOGY

## 2018-05-22 PROCEDURE — 99213 OFFICE O/P EST LOW 20 MIN: CPT | Mod: 25,S$GLB,, | Performed by: DERMATOLOGY

## 2018-05-22 RX ORDER — FLUOROURACIL 50 MG/G
CREAM TOPICAL
Qty: 40 G | Refills: 3 | Status: SHIPPED | OUTPATIENT
Start: 2018-05-22

## 2018-05-22 NOTE — PROGRESS NOTES
Subjective:       Patient ID:  Asaf Pendleton is a 89 y.o. male who presents for   Chief Complaint   Patient presents with    Skin Check     nose, scalp     History of Present Illness: The patient presents with chief complaint of spots.  Location: nose and scalp  Duration: months  Signs/Symptoms: none     Prior treatments: none          Review of Systems   Constitutional: Negative for fever.   Skin: Negative for itching and rash.   Hematologic/Lymphatic: Does not bruise/bleed easily.        Objective:    Physical Exam   Constitutional: He appears well-developed and well-nourished. No distress.   Neurological: He is alert and oriented to person, place, and time. He is not disoriented.   Psychiatric: He has a normal mood and affect.   Skin:   Areas Examined (abnormalities noted in diagram):   Scalp / Hair Palpated and Inspected  Head / Face Inspection Performed  Neck Inspection Performed  Chest / Axilla Inspection Performed  RUE Inspected  LUE Inspection Performed                   Diagram Legend     Erythematous scaling macule/papule c/w actinic keratosis       Vascular papule c/w angioma      Pigmented verrucoid papule/plaque c/w seborrheic keratosis      Yellow umbilicated papule c/w sebaceous hyperplasia      Irregularly shaped tan macule c/w lentigo     1-2 mm smooth white papules consistent with Milia      Movable subcutaneous cyst with punctum c/w epidermal inclusion cyst      Subcutaneous movable cyst c/w pilar cyst      Firm pink to brown papule c/w dermatofibroma      Pedunculated fleshy papule(s) c/w skin tag(s)      Evenly pigmented macule c/w junctional nevus     Mildly variegated pigmented, slightly irregular-bordered macule c/w mildly atypical nevus      Flesh colored to evenly pigmented papule c/w intradermal nevus       Pink pearly papule/plaque c/w basal cell carcinoma      Erythematous hyperkeratotic cursted plaque c/w SCC      Surgical scar with no sign of skin cancer recurrence      Open  and closed comedones      Inflammatory papules and pustules      Verrucoid papule consistent consistent with wart     Erythematous eczematous patches and plaques     Dystrophic onycholytic nail with subungual debris c/w onychomycosis     Umbilicated papule    Erythematous-base heme-crusted tan verrucoid plaque consistent with inflamed seborrheic keratosis     Erythematous Silvery Scaling Plaque c/w Psoriasis     See annotation      Assessment / Plan:        Multiple actinic keratoses  -     fluorouracil (EFUDEX) 5 % cream; Use hs for 2 weeks  Dispense: 40 g; Refill: 3   Cryosurgery Procedure Note    Verbal consent from the patient is obtained and the patient is aware of the precancerous quality and need for treatment of these lesions. Liquid nitrogen cryosurgery is applied to the2 actinic keratoses, as detailed in the physical exam, to produce a freeze injury.      History of skin cancer  Comments:  scc forehead removed mohs surgery             Follow-up in about 3 months (around 8/22/2018).

## 2018-06-11 ENCOUNTER — TELEPHONE (OUTPATIENT)
Dept: PULMONOLOGY | Facility: CLINIC | Age: 83
End: 2018-06-11

## 2018-06-13 ENCOUNTER — TELEPHONE (OUTPATIENT)
Dept: PULMONOLOGY | Facility: CLINIC | Age: 83
End: 2018-06-13

## 2018-07-27 DIAGNOSIS — J45.20 ASTHMA, MILD INTERMITTENT, WELL-CONTROLLED: Primary | ICD-10-CM

## 2018-07-27 RX ORDER — BUDESONIDE AND FORMOTEROL FUMARATE DIHYDRATE 160; 4.5 UG/1; UG/1
2 AEROSOL RESPIRATORY (INHALATION) 2 TIMES DAILY
Qty: 1 INHALER | Refills: 12 | Status: SHIPPED | OUTPATIENT
Start: 2018-07-27 | End: 2018-08-26

## 2018-08-01 DIAGNOSIS — R79.89 LOW TESTOSTERONE: ICD-10-CM

## 2018-08-01 RX ORDER — TESTOSTERONE 20.25 MG/1.25G
GEL TOPICAL
Qty: 1 BOTTLE | Refills: 4 | Status: SHIPPED | OUTPATIENT
Start: 2018-08-01 | End: 2018-09-06 | Stop reason: SDUPTHER

## 2018-08-17 ENCOUNTER — TELEPHONE (OUTPATIENT)
Dept: AUDIOLOGY | Facility: CLINIC | Age: 83
End: 2018-08-17

## 2018-08-20 ENCOUNTER — CLINICAL SUPPORT (OUTPATIENT)
Dept: AUDIOLOGY | Facility: CLINIC | Age: 83
End: 2018-08-20
Payer: MEDICARE

## 2018-08-20 ENCOUNTER — CLINICAL SUPPORT (OUTPATIENT)
Dept: AUDIOLOGY | Facility: CLINIC | Age: 83
End: 2018-08-20

## 2018-08-20 DIAGNOSIS — H90.3 SENSORINEURAL HEARING LOSS, BILATERAL: Primary | ICD-10-CM

## 2018-08-20 PROCEDURE — 92557 COMPREHENSIVE HEARING TEST: CPT | Mod: S$GLB,,, | Performed by: AUDIOLOGIST

## 2018-08-20 NOTE — PROGRESS NOTES
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 Recommended updated [employer] technology. Discussed pros and cons of various styles and technology levels. Patient elected to proceed with [employer]tingXpert Evoke 440 Full Shell Hearing aids. Patient acknowledged understanding of the 30 day trial period, $250 restocking fee from the time of order, and the fact that we do not file insurance on behalf of the patient. Patient was advised to call or email with any questions. He will return on 9/10/2018 for his HAE.

## 2018-08-20 NOTE — PROGRESS NOTES
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  He reported that he broke his left hearing aid and was interested in updated technology, rather than spending money to fix his broken hearing aid.     Audiological testing revealed a moderate to severe SNHL, bilaterally. A speech reception threshold was obtained at 70 dBHL, bilaterally. Speech discrimination was 60%, bilaterally.    Recommendations:  1. Otologic evaluation  2. Annual hearing evaluation  3. Noise protection  4. Hearing aid consultation for updated binaural technology

## 2018-08-22 ENCOUNTER — OFFICE VISIT (OUTPATIENT)
Dept: DERMATOLOGY | Facility: CLINIC | Age: 83
End: 2018-08-22
Payer: MEDICARE

## 2018-08-22 VITALS — WEIGHT: 203 LBS | BODY MASS INDEX: 29.13 KG/M2

## 2018-08-22 DIAGNOSIS — L81.4 LENTIGINES: Primary | ICD-10-CM

## 2018-08-22 DIAGNOSIS — Z87.2 HISTORY OF ACTINIC KERATOSES: ICD-10-CM

## 2018-08-22 DIAGNOSIS — Z85.828 HISTORY OF SKIN CANCER: ICD-10-CM

## 2018-08-22 PROCEDURE — 99999 PR PBB SHADOW E&M-EST. PATIENT-LVL III: CPT | Mod: PBBFAC,,, | Performed by: DERMATOLOGY

## 2018-08-22 PROCEDURE — 99212 OFFICE O/P EST SF 10 MIN: CPT | Mod: S$GLB,,, | Performed by: DERMATOLOGY

## 2018-09-06 DIAGNOSIS — R79.89 LOW TESTOSTERONE: ICD-10-CM

## 2018-09-06 RX ORDER — TESTOSTERONE 20.25 MG/1.25G
GEL TOPICAL
Qty: 1 BOTTLE | Refills: 4 | Status: SHIPPED | OUTPATIENT
Start: 2018-09-06 | End: 2018-10-23 | Stop reason: SDUPTHER

## 2018-09-20 ENCOUNTER — CLINICAL SUPPORT (OUTPATIENT)
Dept: AUDIOLOGY | Facility: CLINIC | Age: 83
End: 2018-09-20

## 2018-09-20 DIAGNOSIS — H90.3 SENSORINEURAL HEARING LOSS, BILATERAL: Primary | ICD-10-CM

## 2018-09-20 NOTE — PROGRESS NOTES
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 Hearing aids were programmed based on his most recent audiogram and set to experienced user settings. The left vent was plugged. Feedback was run and a minor adjustment was made for feedback in the left hearing aid. Dr. 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 We discussed battery size, the different battery door, wiping down hearing aids, brushing hearing aids and changing wax filters. 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 He did not want to set up a f/u appointment at today's appointment. He was encouraged to call if he has any issues or needs any adjustments.     Hearing Aid Information:    Right ear  : Widex   Model:  Fusion 440  Type:  ITE  Color: Beige  Battery: 312  Tube/ length & power:  IP  Serial number: [id number]   Warranty expiration:  10/10/21   L and D expiration:  10/10/21      Left ear  : Widex   Model:  Fusion 440  Type:  ITE  Color: Beige  Battery: 312  Tube/ length & power:  IP  Serial number: [id number]  Warranty expiration:  10/10/21   L and D expiration:  10/10/21      : Widex   Model:  Fusion 440  Type:  ITE  Color: Beige  Battery: 312  Tube/ length & power:  IP  Serial number: [id number]   Warranty expiration:  10/10/21   L and D expiration:  10/10/21     Accessories  RC DEX  Serial number: [id number]   Warranty expiration: 10/10/19   L and D expiration:  9/10/18

## 2018-09-24 ENCOUNTER — PES CALL (OUTPATIENT)
Dept: ADMINISTRATIVE | Facility: CLINIC | Age: 83
End: 2018-09-24

## 2018-10-10 ENCOUNTER — PES CALL (OUTPATIENT)
Dept: ADMINISTRATIVE | Facility: CLINIC | Age: 83
End: 2018-10-10

## 2018-10-17 ENCOUNTER — TELEPHONE (OUTPATIENT)
Dept: AUDIOLOGY | Facility: CLINIC | Age: 83
End: 2018-10-17

## 2018-10-23 DIAGNOSIS — R79.89 LOW TESTOSTERONE: ICD-10-CM

## 2018-10-23 RX ORDER — TESTOSTERONE 20.25 MG/1.25G
GEL TOPICAL
Qty: 1 BOTTLE | Refills: 4 | Status: SHIPPED | OUTPATIENT
Start: 2018-10-23 | End: 2018-12-14 | Stop reason: SDUPTHER

## 2018-12-14 DIAGNOSIS — R79.89 LOW TESTOSTERONE: ICD-10-CM

## 2018-12-14 RX ORDER — TESTOSTERONE 20.25 MG/1.25G
GEL TOPICAL
Qty: 1 BOTTLE | Refills: 4 | Status: SHIPPED | OUTPATIENT
Start: 2018-12-14 | End: 2019-02-19 | Stop reason: SDUPTHER

## 2018-12-14 NOTE — TELEPHONE ENCOUNTER
----- Message from Vivien Kruse sent at 12/14/2018  1:05 PM CST -----  Contact:    Self    Rx Refill/Request     Is this a Refill or New Rx:   Refill     Rx Name and Strength:     testosterone (ANDROGEL) 20.25 mg/1.25 gram (1.62 %) GlPm  Preferred Pharmacy with phone number:     [facility]    [address] AT [facility] [address] [phone]    Communication Preference:   Phone     [phone]  Additional Information:

## 2019-01-02 ENCOUNTER — OFFICE VISIT (OUTPATIENT)
Dept: OPHTHALMOLOGY | Facility: CLINIC | Age: 84
End: 2019-01-02
Payer: MEDICARE

## 2019-01-02 DIAGNOSIS — Z96.1 PSEUDOPHAKIA OF BOTH EYES: ICD-10-CM

## 2019-01-02 DIAGNOSIS — H35.3134 ADVANCED ATROPHIC NONEXUDATIVE AGE-RELATED MACULAR DEGENERATION OF BOTH EYES WITH SUBFOVEAL INVOLVEMENT: Primary | ICD-10-CM

## 2019-01-02 DIAGNOSIS — G51.39 HEMIFACIAL SPASM: ICD-10-CM

## 2019-01-02 DIAGNOSIS — H54.10 BLINDNESS AND LOW VISION: ICD-10-CM

## 2019-01-02 PROCEDURE — 99999 PR PBB SHADOW E&M-EST. PATIENT-LVL II: ICD-10-PCS | Mod: PBBFAC,HCNC,, | Performed by: OPHTHALMOLOGY

## 2019-01-02 PROCEDURE — 92014 COMPRE OPH EXAM EST PT 1/>: CPT | Mod: HCNC,S$GLB,, | Performed by: OPHTHALMOLOGY

## 2019-01-02 PROCEDURE — 92014 PR EYE EXAM, EST PATIENT,COMPREHESV: ICD-10-PCS | Mod: HCNC,S$GLB,, | Performed by: OPHTHALMOLOGY

## 2019-01-02 PROCEDURE — 99999 PR PBB SHADOW E&M-EST. PATIENT-LVL II: CPT | Mod: PBBFAC,HCNC,, | Performed by: OPHTHALMOLOGY

## 2019-01-02 NOTE — PROGRESS NOTES
Assessment /Plan     For exam results, see Encounter Report.    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  Patient to return sooner for changes with chart including decreasing vision and increasing metamorphopsia.    [facility] --> using [facility]      Dry Eye Syndrome: discussed use of warm compresses, preserved & non-preserved artificial tears, gel and PM ointment options.  Also discussed options utilizing medications.    PC IOL OU  Quiet    Plan  [facility]  [facility] service --> establish care with gabrielle Brenner --> [facility]  Sooner prn with good understanding

## 2019-01-25 ENCOUNTER — OFFICE VISIT (OUTPATIENT)
Dept: OPTOMETRY | Facility: CLINIC | Age: 84
End: 2019-01-25
Payer: MEDICARE

## 2019-01-25 ENCOUNTER — INITIAL CONSULT (OUTPATIENT)
Dept: OPTOMETRY | Facility: CLINIC | Age: 84
End: 2019-01-25
Payer: MEDICARE

## 2019-01-25 DIAGNOSIS — H54.3 LOW VISION, BOTH EYES: Primary | ICD-10-CM

## 2019-01-25 PROCEDURE — 92015 DETERMINE REFRACTIVE STATE: CPT | Mod: HCNC,S$GLB,, | Performed by: OPTOMETRIST

## 2019-01-25 PROCEDURE — 92015 PR REFRACTION: ICD-10-PCS | Mod: HCNC,S$GLB,, | Performed by: OPTOMETRIST

## 2019-01-25 PROCEDURE — 99999 PR PBB SHADOW E&M-EST. PATIENT-LVL II: CPT | Mod: PBBFAC,HCNC,, | Performed by: OPTOMETRIST

## 2019-01-25 PROCEDURE — 99499 UNLISTED E&M SERVICE: CPT | Mod: HCNC,S$GLB,, | Performed by: OPTOMETRIST

## 2019-01-25 PROCEDURE — 99999 PR PBB SHADOW E&M-EST. PATIENT-LVL II: ICD-10-PCS | Mod: PBBFAC,HCNC,, | Performed by: OPTOMETRIST

## 2019-01-25 PROCEDURE — 99499 NO LOS: ICD-10-PCS | Mod: HCNC,S$GLB,, | Performed by: OPTOMETRIST

## 2019-01-25 NOTE — PROGRESS NOTES
Assessment /Plan     For exam results, see Encounter Report.    Low vision, both eyes      1. See other exam same date for low vision.

## 2019-01-25 NOTE — PROGRESS NOTES
ALBINA VELAZCO 01/2019 with Dr. Bass  Glasses about 5 yrs. Old.  Patient would   like to see better for wood working.  Patient had purchased hand held   magnifier  From [employer], but needs something that's hands free.    Patient would like to be able to read again.  Patient lives with wife.    Has computer but hasn't mad font larger and doesn't have software to help.    No trouble with household tasks.    Last edited by Danielle Rocha on 1/25/2019  1:35 PM. (History)            Assessment /Plan     For exam results, see Encounter Report.    Low vision, both eyes      1. No change with glasses, not able to read well with hand held magnifier, able to read newspaper with [employer] digital magnifier. Offered [facility] referral.

## 2019-01-25 NOTE — LETTER
January 25, 2019      Bala Bass MD  [address] [facility] [address]           [place] - Optometry  [address]  [place] [address]  Phone: [phone]  Fax: [phone]          Patient: Asaf Pendleton   MR Number: [id number]   YOB: 1924   Date of Visit: 1/25/2019       Dear Dr. Bala Bass:    Thank you for referring Asaf Pendleton to me for evaluation. Attached you will find relevant portions of my assessment and plan of care.    If you have questions, please do not hesitate to call me. I look forward to following Asaf Pendleton along with you.    Sincerely,    Ronen Bernardo, OD    Enclosure  CC:  No Recipients    If you would like to receive this communication electronically, please contact [email or web address] or [phone] to request more information on Dr. TATTOFF Link access.    For providers and/or their staff who would like to refer a patient to [facility], please contact us through our one-stop-shop provider referral line, [facility], at [phone].    If you feel you have received this communication in error or would no longer like to receive these types of communications, please e-mail [email or web address]

## 2019-02-04 ENCOUNTER — OFFICE VISIT (OUTPATIENT)
Dept: OPTOMETRY | Facility: CLINIC | Age: 84
End: 2019-02-04
Payer: MEDICARE

## 2019-02-04 DIAGNOSIS — H54.3 LOW VISION, BOTH EYES: Primary | ICD-10-CM

## 2019-02-04 PROCEDURE — 99499 NO LOS: ICD-10-PCS | Mod: HCNC,S$GLB,, | Performed by: OPTOMETRIST

## 2019-02-04 PROCEDURE — 99999 PR PBB SHADOW E&M-EST. PATIENT-LVL II: ICD-10-PCS | Mod: PBBFAC,HCNC,, | Performed by: OPTOMETRIST

## 2019-02-04 PROCEDURE — 99999 PR PBB SHADOW E&M-EST. PATIENT-LVL II: CPT | Mod: PBBFAC,HCNC,, | Performed by: OPTOMETRIST

## 2019-02-04 PROCEDURE — 99499 UNLISTED E&M SERVICE: CPT | Mod: HCNC,S$GLB,, | Performed by: OPTOMETRIST

## 2019-02-04 NOTE — PROGRESS NOTES
HPI     Here to gets directions on using digital magnifier    Last edited by Ronen Bernardo, OD on 2/4/2019 10:47 AM. (History)            Assessment /Plan     For exam results, see Encounter Report.    Low vision, both eyes      1. Gave instructions on how to use digital magnifier. Offered Low vision appt. RTC if probs with device.

## 2019-02-11 ENCOUNTER — PES CALL (OUTPATIENT)
Dept: ADMINISTRATIVE | Facility: CLINIC | Age: 84
End: 2019-02-11

## 2019-02-19 DIAGNOSIS — J45.20 ASTHMA, MILD INTERMITTENT, WELL-CONTROLLED: ICD-10-CM

## 2019-02-19 DIAGNOSIS — R79.89 LOW TESTOSTERONE: ICD-10-CM

## 2019-02-19 RX ORDER — TESTOSTERONE 20.25 MG/1.25G
GEL TOPICAL
Qty: 1 BOTTLE | Refills: 5 | Status: SHIPPED | OUTPATIENT
Start: 2019-02-19 | End: 2019-08-08 | Stop reason: SDUPTHER

## 2019-02-19 RX ORDER — ALBUTEROL SULFATE 90 UG/1
2 AEROSOL, METERED RESPIRATORY (INHALATION) EVERY 4 HOURS PRN
Qty: 1 INHALER | Refills: 12 | Status: SHIPPED | OUTPATIENT
Start: 2019-02-19

## 2019-03-19 DIAGNOSIS — E03.9 HYPOTHYROIDISM, UNSPECIFIED TYPE: ICD-10-CM

## 2019-03-19 DIAGNOSIS — R79.89 DECREASED TESTOSTERONE LEVEL: ICD-10-CM

## 2019-03-19 DIAGNOSIS — Z00.00 INITIAL MEDICARE ANNUAL WELLNESS VISIT: Primary | ICD-10-CM

## 2019-03-19 DIAGNOSIS — E78.5 HYPERLIPIDEMIA, UNSPECIFIED HYPERLIPIDEMIA TYPE: ICD-10-CM

## 2019-04-23 ENCOUNTER — DOCUMENTATION ONLY (OUTPATIENT)
Dept: AUDIOLOGY | Facility: CLINIC | Age: 84
End: 2019-04-23

## 2019-04-23 NOTE — PROGRESS NOTES
In Warranty repair    RC-Dex stopped working so I will send in to [facility] for repair.  Patient will see Que Farrell when it comes back.    RC DEX  Serial number: [id number]   Warranty expiration: 10/10/19   L and D expiration:  9/10/18

## 2019-04-24 ENCOUNTER — TELEPHONE (OUTPATIENT)
Dept: AUDIOLOGY | Facility: CLINIC | Age: 84
End: 2019-04-24

## 2019-05-09 ENCOUNTER — TELEPHONE (OUTPATIENT)
Dept: AUDIOLOGY | Facility: CLINIC | Age: 84
End: 2019-05-09

## 2019-05-30 ENCOUNTER — TELEPHONE (OUTPATIENT)
Dept: AUDIOLOGY | Facility: CLINIC | Age: 84
End: 2019-05-30

## 2019-05-30 NOTE — TELEPHONE ENCOUNTER
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 Whenever you get back can you call and set him up a new appointment. There is no rush, as you know I will be out of town until the 12th.     Thanks so much,    Que

## 2019-05-31 ENCOUNTER — TELEPHONE (OUTPATIENT)
Dept: AUDIOLOGY | Facility: CLINIC | Age: 84
End: 2019-05-31

## 2019-08-08 DIAGNOSIS — R79.89 DECREASED TESTOSTERONE LEVEL: Primary | ICD-10-CM

## 2019-08-08 DIAGNOSIS — R79.89 LOW TESTOSTERONE: ICD-10-CM

## 2019-08-08 RX ORDER — TESTOSTERONE 20.25 MG/1.25G
GEL TOPICAL
Qty: 75 G | Refills: 5 | Status: SHIPPED | OUTPATIENT
Start: 2019-08-08 | End: 2019-08-12

## 2019-08-09 ENCOUNTER — TELEPHONE (OUTPATIENT)
Dept: PULMONOLOGY | Facility: CLINIC | Age: 84
End: 2019-08-09

## 2019-08-09 NOTE — TELEPHONE ENCOUNTER
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 His Androgel has been rejected by his insurance. Yesterday I faxed it to [facility] as they can assist with the authorization. 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 I called  Helder to let her know it is still pending here at [facility]. Kristy Villa LPN.

## 2019-08-09 NOTE — TELEPHONE ENCOUNTER
----- Message from Kayleen Agudelo sent at 8/9/2019  4:01 PM CDT -----  Contact: self [phone]  .Needs Advice    Reason for call:        Communication Preference:phone    Additional Information:phamracy would like to know how many pumps does the patient use for his  testosterone (ANDROGEL) 20.25 mg/1.25 gram (1.62 %) GlPm  Please call back to discuss

## 2019-08-12 DIAGNOSIS — R79.89 DECREASED TESTOSTERONE LEVEL: ICD-10-CM

## 2019-08-12 DIAGNOSIS — R79.89 LOW TESTOSTERONE: ICD-10-CM

## 2019-08-13 RX ORDER — TESTOSTERONE 20.25 MG/1.25G
GEL TOPICAL
Qty: 75 G | Refills: 5
Start: 2019-08-13 | End: 2019-09-30 | Stop reason: SDUPTHER

## 2019-08-14 ENCOUNTER — TELEPHONE (OUTPATIENT)
Dept: AUDIOLOGY | Facility: CLINIC | Age: 84
End: 2019-08-14

## 2019-08-15 ENCOUNTER — TELEPHONE (OUTPATIENT)
Dept: PHARMACY | Facility: CLINIC | Age: 84
End: 2019-08-15

## 2019-08-15 NOTE — TELEPHONE ENCOUNTER
Good Afternoon.    The prior authorization for Mr. Pendleton's Androgel prescription has been denied. [facility] Medicare insurance will not cover the medication due to the associated diagnosis.    A copy of the determination has been faxed the office.    If there are any additional questions or concerns please contact the pharmacy at, [phone].    Thanks.    Daya Adames CPhT.  Patient Care Advocate  [facility]  [email or web address]

## 2019-08-19 ENCOUNTER — TELEPHONE (OUTPATIENT)
Dept: AUDIOLOGY | Facility: CLINIC | Age: 84
End: 2019-08-19

## 2019-08-19 NOTE — TELEPHONE ENCOUNTER
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    Que San  ----- Message -----  From: Elisa Vegas  Sent: 8/17/2019   9:21 AM  To: BAHMAN Pepper    Requests to reschedule missed appointment. Please advise.

## 2019-08-28 ENCOUNTER — CLINICAL SUPPORT (OUTPATIENT)
Dept: AUDIOLOGY | Facility: CLINIC | Age: 84
End: 2019-08-28
Payer: MEDICARE

## 2019-08-28 DIAGNOSIS — H90.3 SENSORINEURAL HEARING LOSS, BILATERAL: Primary | ICD-10-CM

## 2019-08-28 PROCEDURE — 99499 NO LOS: ICD-10-PCS | Mod: HCNC,S$GLB,, | Performed by: AUDIOLOGIST

## 2019-08-28 PROCEDURE — 99499 UNLISTED E&M SERVICE: CPT | Mod: HCNC,S$GLB,, | Performed by: AUDIOLOGIST

## 2019-08-28 NOTE — PROGRESS NOTES
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 He returned the remote control that he borrowed. The remote was paired to his hearing aids. Overall gain and mid frequency gain was increased. The patient was encouraged to call if he has any issues/needs further adjustments.

## 2019-09-03 ENCOUNTER — CLINICAL SUPPORT (OUTPATIENT)
Dept: AUDIOLOGY | Facility: CLINIC | Age: 84
End: 2019-09-03
Payer: MEDICARE

## 2019-09-03 DIAGNOSIS — H90.3 SENSORINEURAL HEARING LOSS, BILATERAL: Primary | ICD-10-CM

## 2019-09-03 PROCEDURE — 99499 UNLISTED E&M SERVICE: CPT | Mod: HCNC,S$GLB,, | Performed by: AUDIOLOGIST

## 2019-09-03 PROCEDURE — 99499 NO LOS: ICD-10-PCS | Mod: HCNC,S$GLB,, | Performed by: AUDIOLOGIST

## 2019-09-03 NOTE — PROGRESS NOTES
Patient seen w/ c/o remote not working. His remote was paired to his hearing aids at his last appointment. The battery was in upside down. The patient did not recall doing this. Battery was changed and the remote was returned to the patient in working order. He confirmed that it was working at today's appointment. He will call if he has any issues/needs further adjustments.     UPDATED  DEX INFORMATION:  SN: [id number]  Repair expiration: 10/10/19

## 2019-09-17 ENCOUNTER — HOSPITAL ENCOUNTER (EMERGENCY)
Facility: HOSPITAL | Age: 84
Discharge: HOME | End: 2019-09-17
Attending: EMERGENCY MEDICINE
Payer: MEDICARE

## 2019-09-17 VITALS
HEART RATE: 55 BPM | RESPIRATION RATE: 20 BRPM | HEIGHT: 72 IN | BODY MASS INDEX: 27.09 KG/M2 | OXYGEN SATURATION: 97 % | WEIGHT: 200 LBS | DIASTOLIC BLOOD PRESSURE: 72 MMHG | SYSTOLIC BLOOD PRESSURE: 153 MMHG | TEMPERATURE: 98 F

## 2019-09-17 DIAGNOSIS — M25.551 HIP PAIN, ACUTE, RIGHT: ICD-10-CM

## 2019-09-17 DIAGNOSIS — S70.11XA CONTUSION OF HIP AND THIGH, RIGHT, INITIAL ENCOUNTER: Primary | ICD-10-CM

## 2019-09-17 DIAGNOSIS — S70.01XA CONTUSION OF HIP AND THIGH, RIGHT, INITIAL ENCOUNTER: Primary | ICD-10-CM

## 2019-09-17 PROCEDURE — 99284 PR EMERGENCY DEPT VISIT,LEVEL IV: ICD-10-PCS | Mod: HCNC,,, | Performed by: EMERGENCY MEDICINE

## 2019-09-17 PROCEDURE — 99284 EMERGENCY DEPT VISIT MOD MDM: CPT | Mod: HCNC,,, | Performed by: EMERGENCY MEDICINE

## 2019-09-17 PROCEDURE — 25000003 PHARM REV CODE 250: Mod: HCNC | Performed by: EMERGENCY MEDICINE

## 2019-09-17 PROCEDURE — 99284 EMERGENCY DEPT VISIT MOD MDM: CPT | Mod: 25,HCNC

## 2019-09-17 RX ORDER — ACETAMINOPHEN 500 MG
1000 TABLET ORAL
Status: COMPLETED | OUTPATIENT
Start: 2019-09-17 | End: 2019-09-17

## 2019-09-17 RX ADMIN — ACETAMINOPHEN 1000 MG: 500 TABLET ORAL at 04:09

## 2019-09-17 NOTE — ED PROVIDER NOTES
Source of History:  Patient    Chief complaint:  Hip Injury (fell 2 nights ago fell, had hip replaced several yrs ago, still having alot of pain)      HPI:  Asaf Pendleton is a 89 y.o. male presenting with right hip pain since a fall two days ago.  He has an artifical hip in that area.  It only hurts when he walks and with certain movements.  No pain with sitting.  No other complaints.    ROS: As per HPI and below:  General: No fever.  No chills.  Eyes: No visual changes.  Head: No headache.    Integument: No rashes or lesions.  Chest: No shortness of breath.  Cardiovascular: No chest pain.  Abdomen: No abdominal pain.  No nausea or vomiting.  Urinary: No abnormal urination.  Neurologic: No focal weakness.  No numbness.  Hematologic: No easy bruising.  Endocrine: No excessive thirst or urination.      Review of patient's allergies indicates:   Allergen Reactions    Codeine Other (See Comments)     Pt reports allergy to Codeine-pale, lightheaded       No current facility-administered medications on file prior to encounter.      Current Outpatient Medications on File Prior to Encounter   Medication Sig Dispense Refill    albuterol (PROVENTIL) 2.5 mg /3 mL (0.083 %) nebulizer solution Take 2.5 mg by nebulization every 6 (six) hours as needed.        albuterol (PROVENTIL/VENTOLIN HFA) 90 mcg/actuation inhaler Inhale 2 puffs into the lungs every 4 (four) hours as needed for Wheezing or Shortness of Breath. 1 Inhaler 12    budesonide-formoterol 160-4.5 mcg (SYMBICORT) 160-4.5 mcg/actuation HFAA Inhale 2 puffs into the lungs 2 (two) times daily. 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    Hemifacial spasm      Lumbar spondylosis 1/8/2014    Macular degeneration - Both Eyes 8/24/2012    Prostate cancer     Squamous cell carcinoma      Past Surgical History:   Procedure Laterality Date    BIOPSY, WITH CT GUIDANCE N/A 10/9/2013    Performed by [facility]    CATARACT EXTRACTION      right hip replacement      SPINE SURGERY      Decomp/lami L2-5    VASECTOMY         Social History     Socioeconomic History    Marital status:      Spouse name: Not on file    Number of children: Not on file    Years of education: Not on file    Highest education level: Not on file   Occupational History    Occupation: retired surgeon   Social Needs    Financial resource strain: Not on file    Food insecurity:     Worry: Not on file     Inability: Not on file    Transportation needs:     Medical: Not on file     Non-medical: Not on file   Tobacco Use    Smoking status: Never Smoker    Smokeless tobacco: Never Used   Substance and Sexual Activity    Alcohol use: Yes     Alcohol/week: 0.0 oz     Comment: mod    Drug use: No    Sexual activity: Yes     Partners: Female   Lifestyle    Physical activity:     Days per week: Not on file     Minutes per session: Not on file    Stress: Not on file   Relationships    Social connections:     Talks on phone: Not on file     Gets together: Not on file     Attends Judaism service: Not on file     Active member of club or organization: Not on file     Attends meetings of clubs or organizations: Not on file     Relationship status: Not on file   Other Topics Concern    Not on file   Social History Narrative    Not on file       Family History   Problem Relation Age of Onset    Melanoma Neg Hx        Physical Exam:    Vitals:    09/17/19 1429 09/17/19 1856   BP: 126/66 (!) 153/72   BP Location:  Left arm   Pulse: 75 (!) 55   Resp: 18 20   Temp: 98 °F (36.7 °C) 97.6 °F (36.4 °C)   TempSrc: Oral    SpO2: 96% 97%   Weight: 90.7 kg (200 lb)    Height: 6'  (1.829 m)      Appearance: No acute distress.  Skin: No rashes seen.  Good turgor.  No abrasions.  No ecchymoses.  Eyes: No conjunctival injection.  ENT: Oropharynx clear.    Chest: Clear to auscultation bilaterally.  Good air movement.  No wheezes.  No rhonchi.  Cardiovascular: Regular rate and rhythm.  No murmurs. No gallops. No rubs.  Abdomen: Soft.  Not distended.  Nontender.  No guarding.  No rebound.  Musculoskeletal: Good range of motion all joints.  Not a lot of pain with right hip motion.  No deformities.  Neck supple.  No meningismus.  Neurologic: Motor intact.  Sensation intact.  Cerebellar intact.  Cranial nerves intact.  Mental Status:  Alert and oriented x 3.  Appropriate, conversant.      Initial Impression:  Right hip pain, artificial hip, doubt dislocation based on benign exam with movement.  Somewhat tender over trochanter    Laboratory Studies:  Labs Reviewed - No data to display      X-rays (independently interpreted by me): no fractures seen   Chart reviewed.     Imaging Results          CT Hip Without Contrast Right (Final result)  Result time 09/17/19 19:30:01    Final result by Arben Jordan MD (09/17/19 19:30:01)                 Impression:      In this patient with right hip arthroplasty, no evidence of fracture or dislocation.    Multiple stable degenerative findings including L3 and L5 vertebral body compression deformities.    Other findings as above.    Electronically signed by resident: Buzz Abreu  Date:    09/17/2019  Time:    19:07    Electronically signed by: Arben Jordan MD  Date:    09/17/2019  Time:    19:30             Narrative:    EXAMINATION:  CT HIP WITHOUT CONTRAST RIGHT    CLINICAL HISTORY:  Hip replaced, periprosthetic fracture suspected;    TECHNIQUE:  Axial images were obtained of the right hip with coronal and sagittal reformats.    COMPARISON:  Pelvis radiograph 09/17/2019, MRI pelvis without contrast 09/25/2013    FINDINGS:  Osseous structures: Overall, osseous  structures demonstrate demineralization.  No significant lytic or osseous destructive lesion identified.    There is a right hip arthroplasty, surrounded by stable age-appropriate degenerative changes of the right hip.  There is no evidence of fracture or dislocation.    Partially visualized compression deformities at the L3 and L5 vertebral levels with intervertebral methylmethacrylate present, unchanged when compared to MRI 10/27/2010.    Soft tissues: Soft tissues are unremarkable.  No fluid collection or inflammatory change.    Vasculature: The partially visualized aorta demonstrates significant atherosclerosis.  No evidence of aneurysm.    Abdominal contents: The partially visualized abdomen demonstrates no significant abnormality.  Few, scattered colonic diverticula without evidence of diverticulitis.    Other: No other significant abnormality identified.                               X-Ray Pelvis Routine AP (Final result)  Result time 09/17/19 16:27:02   Procedure changed from X-Ray Hip 2 View Right     Final result by Arben Jordan MD (09/17/19 16:27:02)                 Impression:      See above      Electronically signed by: Arben Jordan MD  Date:    09/17/2019  Time:    16:27             Narrative:    EXAMINATION:  XR PELVIS ROUTINE AP    CLINICAL HISTORY:  HIP PAIN ;  Pain in right hip    TECHNIQUE:  AP view of the pelvis was performed.    COMPARISON:  None 11/08/2010.    FINDINGS:  Right hip arthroplasty identified.  The position alignment is satisfactory and unchanged as compared to the previous study.  DJD.  No acute fracture or bone destruction identified.  Postsurgical changes in the lumbar spine.  Penile prosthesis as before.                               X-Ray Femur Ap/Lat Right (Final result)  Result time 09/17/19 16:23:56    Final result by Abebe Stark MD (09/17/19 16:23:56)                 Impression:      No definite evidence for femur fracture.      Electronically signed by: Abebe  MD Lincoln  Date:    09/17/2019  Time:    16:23             Narrative:    EXAMINATION:  XR FEMUR 2 VIEW RIGHT    TECHNIQUE:  AP and lateral views of the right femur were performed.    COMPARISON:  None    FINDINGS:  Postsurgical changes from right hip arthroplasty are noted.  No definite evidence for femoral fracture.  There is advanced joint space narrowing the knee.  Severe vascular calcifications are noted.  Partially visualized is in a prosthesis.  Clips are seen in the pelvis.                                Medications Given:  Medications   acetaminophen tablet 1,000 mg (1,000 mg Oral Given 9/17/19 1620)           ED Course:       MDM:    89 y.o. male with hip pain in an artificial hip for 2 days after a fall.  Mostly with walking.  Little to no pain when I range the hip on the stretcher.  Xrays and CT all negative.  Tylenol has helped tremendously.  Plan d/c.  Patient requests walker, will provide.      Diagnostic Impression:    1. Contusion of hip and thigh, right, initial encounter    2. Hip pain, acute, right               Manuel Faria MD  09/17/19 [id number]

## 2019-09-18 NOTE — ED NOTES
Upon discharge patient found to be AAOx4, respirations even and unlabored, skin warm and dry, moves all extremities without difficulty. No new complaints or apparent distress upon discharge.

## 2019-09-28 DIAGNOSIS — R79.89 DECREASED TESTOSTERONE LEVEL: ICD-10-CM

## 2019-09-28 DIAGNOSIS — R79.89 LOW TESTOSTERONE: ICD-10-CM

## 2019-09-28 RX ORDER — TESTOSTERONE 20.25 MG/1.25G
GEL TOPICAL
Qty: 75 G | Refills: 0 | OUTPATIENT
Start: 2019-09-28

## 2019-09-30 DIAGNOSIS — R79.89 DECREASED TESTOSTERONE LEVEL: ICD-10-CM

## 2019-09-30 DIAGNOSIS — R79.89 LOW TESTOSTERONE: ICD-10-CM

## 2019-09-30 RX ORDER — TESTOSTERONE 20.25 MG/1.25G
GEL TOPICAL
Qty: 75 G | Refills: 5 | Status: ON HOLD | OUTPATIENT
Start: 2019-09-30 | End: 2021-01-01 | Stop reason: SDUPTHER

## 2019-10-01 RX ORDER — TESTOSTERONE 20.25 MG/1.25G
GEL TOPICAL
Qty: 75 G | Refills: 5 | Status: ON HOLD | OUTPATIENT
Start: 2019-10-01 | End: 2020-01-01 | Stop reason: HOSPADM

## 2019-10-03 ENCOUNTER — TELEPHONE (OUTPATIENT)
Dept: PHARMACY | Facility: CLINIC | Age: 84
End: 2019-10-03

## 2019-10-07 ENCOUNTER — TELEPHONE (OUTPATIENT)
Dept: PHARMACY | Facility: CLINIC | Age: 84
End: 2019-10-07

## 2019-10-14 ENCOUNTER — TELEPHONE (OUTPATIENT)
Dept: AUDIOLOGY | Facility: CLINIC | Age: 84
End: 2019-10-14

## 2019-10-14 NOTE — TELEPHONE ENCOUNTER
----- Message from Julio Cesar Padron sent at 10/14/2019 11:24 AM CDT -----  Contact:   Requesting to schedule a hearing aid f/u pt can be reached at [phone]

## 2019-10-21 ENCOUNTER — CLINICAL SUPPORT (OUTPATIENT)
Dept: AUDIOLOGY | Facility: CLINIC | Age: 84
End: 2019-10-21
Payer: MEDICARE

## 2019-10-21 DIAGNOSIS — H90.3 SENSORINEURAL HEARING LOSS, BILATERAL: Primary | ICD-10-CM

## 2019-10-21 PROCEDURE — 99499 UNLISTED E&M SERVICE: CPT | Mod: HCNC,S$GLB,, | Performed by: AUDIOLOGIST

## 2019-10-21 PROCEDURE — 99499 NO LOS: ICD-10-PCS | Mod: HCNC,S$GLB,, | Performed by: AUDIOLOGIST

## 2019-10-21 NOTE — PROGRESS NOTES
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 His dog, Genie, ate his right hearing aid. The hearing aid is under warranty and will be repalced for a $400 deductible. We will contact the patient when the hearing aid arrives.

## 2019-10-28 ENCOUNTER — CLINICAL SUPPORT (OUTPATIENT)
Dept: AUDIOLOGY | Facility: CLINIC | Age: 84
End: 2019-10-28
Payer: MEDICARE

## 2019-10-28 DIAGNOSIS — H90.3 SENSORINEURAL HEARING LOSS, BILATERAL: Primary | ICD-10-CM

## 2019-10-28 NOTE — PROGRESS NOTES
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 Hearing aids were programmed together, feedback was run AU, minor adjustments made for volume, LHA cleaned, both were verified to be working with Darlin. 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 Patient was encouraged to call/message with any issues.       Right hearing aid information:   [id number]  SN: [id number]  No longer has l/d warranty on it

## 2019-10-29 ENCOUNTER — TELEPHONE (OUTPATIENT)
Dept: DERMATOLOGY | Facility: CLINIC | Age: 84
End: 2019-10-29

## 2019-10-29 NOTE — TELEPHONE ENCOUNTER
----- Message from Maylin Unger sent at 10/29/2019 11:59 AM CDT -----  Contact: Patient  Need Advice:      Reason For Call: Patient would like to schedule appt       Communication Preference: [phone]    Or [phone]      Additional Information:

## 2019-12-11 ENCOUNTER — TELEPHONE (OUTPATIENT)
Dept: AUDIOLOGY | Facility: CLINIC | Age: 84
End: 2019-12-11

## 2019-12-12 ENCOUNTER — CLINICAL SUPPORT (OUTPATIENT)
Dept: AUDIOLOGY | Facility: CLINIC | Age: 84
End: 2019-12-12
Payer: MEDICARE

## 2019-12-12 DIAGNOSIS — H90.3 SENSORINEURAL HEARING LOSS, BILATERAL: Primary | ICD-10-CM

## 2019-12-12 PROCEDURE — 99499 UNLISTED E&M SERVICE: CPT | Mod: HCNC,S$GLB,, | Performed by: AUDIOLOGIST

## 2019-12-12 PROCEDURE — 99499 NO LOS: ICD-10-PCS | Mod: HCNC,S$GLB,, | Performed by: AUDIOLOGIST

## 2019-12-12 NOTE — PROGRESS NOTES
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 This time she ate his replacement right hearing aid and his original left hearing aid. We will replace the LHA using the loss and damage warranty. The RHA will be replaced by purchasing a new hearing aid. 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 We discussed using multiple cases around the house so Genie will have a more difficult time eating the hearing aids. Will call the patient when devices come in.

## 2019-12-26 ENCOUNTER — CLINICAL SUPPORT (OUTPATIENT)
Dept: AUDIOLOGY | Facility: CLINIC | Age: 84
End: 2019-12-26
Payer: MEDICARE

## 2019-12-26 ENCOUNTER — IMMUNIZATION (OUTPATIENT)
Dept: PHARMACY | Facility: CLINIC | Age: 84
End: 2019-12-26
Payer: MEDICARE

## 2019-12-26 DIAGNOSIS — H90.3 SENSORINEURAL HEARING LOSS, BILATERAL: Primary | ICD-10-CM

## 2019-12-26 NOTE — PROGRESS NOTES
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 Hearing aids were programmed and set to experienced user settings. Feedback was run. Dr. 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 We discussed battery size, the different battery door, wiping down hearing aids, brushing hearing aids and changing wax filters. 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 We discussed the importance of putting the hearing aid in the case when not in ears to avoid the dog eating the devices. Discussed the LHA no longer has loss warranty and if it is eaten or lost it will need to be replaced at full cost. 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     Right ear  : [employer]     Model:  Fusion 440  Type:  ITE  Color: Beige  Battery: 312  Tube/ length & power:  IP  Serial number: [id number]   Warranty expiration: 1/17/23   L and D expiration: 1/17/23      Left ear  : [employer]     Model:  Fusion 440  Type:  ITE  Color: Beige  Battery: 312  Tube/ length & power:  IP  Serial number: [id number]   Warranty expiration:  10/10/21   L and D expiration:  USED       Accessories  RC2-DEX   SN: [id number]   Repair Exp 1/17/21

## 2020-01-01 ENCOUNTER — ANESTHESIA EVENT (OUTPATIENT)
Dept: SURGERY | Facility: HOSPITAL | Age: 85
DRG: 467 | End: 2020-01-01
Payer: MEDICARE

## 2020-01-01 ENCOUNTER — OFFICE VISIT (OUTPATIENT)
Dept: OTOLARYNGOLOGY | Facility: CLINIC | Age: 85
End: 2020-01-01
Payer: MEDICARE

## 2020-01-01 ENCOUNTER — OFFICE VISIT (OUTPATIENT)
Dept: OPTOMETRY | Facility: CLINIC | Age: 85
End: 2020-01-01
Payer: MEDICARE

## 2020-01-01 ENCOUNTER — DOCUMENTATION ONLY (OUTPATIENT)
Dept: AUDIOLOGY | Facility: CLINIC | Age: 85
End: 2020-01-01

## 2020-01-01 ENCOUNTER — HOSPITAL ENCOUNTER (INPATIENT)
Facility: HOSPITAL | Age: 85
LOS: 8 days | Discharge: NURSING, REHAB OR LONG-TERM CARE | DRG: 467 | End: 2021-01-07
Attending: EMERGENCY MEDICINE | Admitting: ORTHOPAEDIC SURGERY
Payer: MEDICARE

## 2020-01-01 ENCOUNTER — EXTERNAL HOME HEALTH (OUTPATIENT)
Dept: HOME HEALTH SERVICES | Facility: HOSPITAL | Age: 85
End: 2020-01-01
Payer: MEDICARE

## 2020-01-01 ENCOUNTER — OFFICE VISIT (OUTPATIENT)
Dept: DERMATOLOGY | Facility: CLINIC | Age: 85
End: 2020-01-01
Payer: MEDICARE

## 2020-01-01 ENCOUNTER — CLINICAL SUPPORT (OUTPATIENT)
Dept: AUDIOLOGY | Facility: CLINIC | Age: 85
End: 2020-01-01
Payer: MEDICARE

## 2020-01-01 ENCOUNTER — OFFICE VISIT (OUTPATIENT)
Dept: OPHTHALMOLOGY | Facility: CLINIC | Age: 85
End: 2020-01-01
Payer: MEDICARE

## 2020-01-01 ENCOUNTER — DOCUMENT SCAN (OUTPATIENT)
Dept: HOME HEALTH SERVICES | Facility: HOSPITAL | Age: 85
End: 2020-01-01
Payer: MEDICARE

## 2020-01-01 ENCOUNTER — TELEPHONE (OUTPATIENT)
Dept: PULMONOLOGY | Facility: CLINIC | Age: 85
End: 2020-01-01

## 2020-01-01 ENCOUNTER — PATIENT OUTREACH (OUTPATIENT)
Dept: ADMINISTRATIVE | Facility: CLINIC | Age: 85
End: 2020-01-01

## 2020-01-01 ENCOUNTER — ANESTHESIA (OUTPATIENT)
Dept: SURGERY | Facility: HOSPITAL | Age: 85
DRG: 467 | End: 2020-01-01
Payer: MEDICARE

## 2020-01-01 ENCOUNTER — TELEPHONE (OUTPATIENT)
Dept: AUDIOLOGY | Facility: CLINIC | Age: 85
End: 2020-01-01

## 2020-01-01 ENCOUNTER — HOSPITAL ENCOUNTER (INPATIENT)
Facility: HOSPITAL | Age: 85
LOS: 2 days | Discharge: HOME HEALTH | DRG: 872 | End: 2020-06-22
Attending: EMERGENCY MEDICINE | Admitting: HOSPITALIST
Payer: MEDICARE

## 2020-01-01 VITALS
HEIGHT: 70 IN | TEMPERATURE: 99 F | WEIGHT: 193.56 LBS | BODY MASS INDEX: 27.71 KG/M2 | SYSTOLIC BLOOD PRESSURE: 129 MMHG | HEART RATE: 71 BPM | OXYGEN SATURATION: 95 % | DIASTOLIC BLOOD PRESSURE: 58 MMHG | RESPIRATION RATE: 18 BRPM

## 2020-01-01 VITALS — WEIGHT: 200 LBS | BODY MASS INDEX: 27.12 KG/M2

## 2020-01-01 DIAGNOSIS — H54.10 BLINDNESS AND LOW VISION: ICD-10-CM

## 2020-01-01 DIAGNOSIS — R79.89 DECREASED TESTOSTERONE LEVEL: ICD-10-CM

## 2020-01-01 DIAGNOSIS — Z85.828 HISTORY OF SKIN CANCER: ICD-10-CM

## 2020-01-01 DIAGNOSIS — H90.3 SENSORINEURAL HEARING LOSS, BILATERAL: Primary | ICD-10-CM

## 2020-01-01 DIAGNOSIS — L81.4 LENTIGINES: ICD-10-CM

## 2020-01-01 DIAGNOSIS — H35.3134 ADVANCED ATROPHIC NONEXUDATIVE AGE-RELATED MACULAR DEGENERATION OF BOTH EYES WITH SUBFOVEAL INVOLVEMENT: ICD-10-CM

## 2020-01-01 DIAGNOSIS — H53.413 SCOTOMA INVOLVING CENTRAL AREA OF BOTH EYES: Primary | ICD-10-CM

## 2020-01-01 DIAGNOSIS — H61.21 IMPACTED CERUMEN OF RIGHT EAR: Primary | ICD-10-CM

## 2020-01-01 DIAGNOSIS — U07.1 COVID-19 VIRUS DETECTED: ICD-10-CM

## 2020-01-01 DIAGNOSIS — Z12.83 SKIN EXAM, SCREENING FOR CANCER: ICD-10-CM

## 2020-01-01 DIAGNOSIS — D53.9 MACROCYTIC ANEMIA: ICD-10-CM

## 2020-01-01 DIAGNOSIS — H54.10 BLINDNESS AND LOW VISION: Primary | ICD-10-CM

## 2020-01-01 DIAGNOSIS — R55 SYNCOPE: ICD-10-CM

## 2020-01-01 DIAGNOSIS — N17.9 ACUTE KIDNEY INJURY SUPERIMPOSED ON CKD: ICD-10-CM

## 2020-01-01 DIAGNOSIS — R55 NEAR SYNCOPE: ICD-10-CM

## 2020-01-01 DIAGNOSIS — Z96.649 PERIPROSTHETIC FRACTURE OF SHAFT OF FEMUR: ICD-10-CM

## 2020-01-01 DIAGNOSIS — N30.01 ACUTE CYSTITIS WITH HEMATURIA: ICD-10-CM

## 2020-01-01 DIAGNOSIS — M97.8XXA PERIPROSTHETIC FRACTURE OF SHAFT OF FEMUR: ICD-10-CM

## 2020-01-01 DIAGNOSIS — S72.91XA CLOSED FRACTURE OF RIGHT FEMUR, UNSPECIFIED FRACTURE MORPHOLOGY, UNSPECIFIED PORTION OF FEMUR, INITIAL ENCOUNTER: ICD-10-CM

## 2020-01-01 DIAGNOSIS — L82.1 SEBORRHEIC KERATOSES: Primary | ICD-10-CM

## 2020-01-01 DIAGNOSIS — A41.9 SEPSIS, DUE TO UNSPECIFIED ORGANISM, UNSPECIFIED WHETHER ACUTE ORGAN DYSFUNCTION PRESENT: Primary | ICD-10-CM

## 2020-01-01 DIAGNOSIS — D62 ACUTE BLOOD LOSS AS CAUSE OF POSTOPERATIVE ANEMIA: ICD-10-CM

## 2020-01-01 DIAGNOSIS — H52.7 REFRACTIVE ERROR: ICD-10-CM

## 2020-01-01 DIAGNOSIS — N18.9 ACUTE KIDNEY INJURY SUPERIMPOSED ON CKD: ICD-10-CM

## 2020-01-01 DIAGNOSIS — R79.89 LOW TESTOSTERONE: ICD-10-CM

## 2020-01-01 DIAGNOSIS — G93.40 ACUTE ENCEPHALOPATHY: ICD-10-CM
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
## 2020-01-01 DIAGNOSIS — Z96.641 HISTORY OF RIGHT HIP REPLACEMENT: ICD-10-CM

## 2020-01-01 DIAGNOSIS — D69.6 THROMBOCYTOPENIA: ICD-10-CM

## 2020-01-01 DIAGNOSIS — H35.3134 ADVANCED ATROPHIC NONEXUDATIVE AGE-RELATED MACULAR DEGENERATION OF BOTH EYES WITH SUBFOVEAL INVOLVEMENT: Primary | ICD-10-CM

## 2020-01-01 LAB
ABO + RH BLD: NORMAL
ALBUMIN SERPL BCP-MCNC: 2.7 G/DL (ref 3.5–5.2)
ALBUMIN SERPL BCP-MCNC: 3 G/DL (ref 3.5–5.2)
ALBUMIN SERPL BCP-MCNC: 3.2 G/DL (ref 3.5–5.2)
ALBUMIN SERPL BCP-MCNC: 3.3 G/DL (ref 3.5–5.2)
ALP SERPL-CCNC: 45 U/L (ref 55–135)
ALP SERPL-CCNC: 46 U/L (ref 55–135)
ALP SERPL-CCNC: 46 U/L (ref 55–135)
ALP SERPL-CCNC: 47 U/L (ref 55–135)
ALP SERPL-CCNC: 47 U/L (ref 55–135)
ALP SERPL-CCNC: 50 U/L (ref 55–135)
ALT SERPL W/O P-5'-P-CCNC: 11 U/L (ref 10–44)
ALT SERPL W/O P-5'-P-CCNC: 12 U/L (ref 10–44)
ALT SERPL W/O P-5'-P-CCNC: 12 U/L (ref 10–44)
ALT SERPL W/O P-5'-P-CCNC: 14 U/L (ref 10–44)
ALT SERPL W/O P-5'-P-CCNC: 14 U/L (ref 10–44)
ALT SERPL W/O P-5'-P-CCNC: 15 U/L (ref 10–44)
ANION GAP SERPL CALC-SCNC: 10 MMOL/L (ref 8–16)
ANION GAP SERPL CALC-SCNC: 5 MMOL/L (ref 8–16)
ANION GAP SERPL CALC-SCNC: 9 MMOL/L (ref 8–16)
ANISOCYTOSIS BLD QL SMEAR: SLIGHT
APTT BLDCRRT: 22.8 SEC (ref 21–32)
AST SERPL-CCNC: 11 U/L (ref 10–40)
AST SERPL-CCNC: 12 U/L (ref 10–40)
AST SERPL-CCNC: 13 U/L (ref 10–40)
AST SERPL-CCNC: 13 U/L (ref 10–40)
AST SERPL-CCNC: 15 U/L (ref 10–40)
AST SERPL-CCNC: 19 U/L (ref 10–40)
BACTERIA #/AREA URNS AUTO: ABNORMAL /HPF
BACTERIA BLD CULT: NORMAL
BACTERIA BLD CULT: NORMAL
BACTERIA UR CULT: ABNORMAL
BASOPHILS # BLD AUTO: 0.02 K/UL (ref 0–0.2)
BASOPHILS # BLD AUTO: 0.03 K/UL (ref 0–0.2)
BASOPHILS # BLD AUTO: 0.03 K/UL (ref 0–0.2)
BASOPHILS NFR BLD: 0.1 % (ref 0–1.9)
BASOPHILS NFR BLD: 0.2 % (ref 0–1.9)
BASOPHILS NFR BLD: 0.3 % (ref 0–1.9)
BASOPHILS NFR BLD: 0.3 % (ref 0–1.9)
BILIRUB SERPL-MCNC: 0.3 MG/DL (ref 0.1–1)
BILIRUB SERPL-MCNC: 0.4 MG/DL (ref 0.1–1)
BILIRUB SERPL-MCNC: 0.5 MG/DL (ref 0.1–1)
BILIRUB UR QL STRIP: NEGATIVE
BILIRUB UR QL STRIP: NEGATIVE
BLD GP AB SCN CELLS X3 SERPL QL: NORMAL
BLD PROD TYP BPU: NORMAL
BLOOD UNIT EXPIRATION DATE: NORMAL
BLOOD UNIT TYPE CODE: 5100
BLOOD UNIT TYPE CODE: 5100
BLOOD UNIT TYPE CODE: 9500
BLOOD UNIT TYPE CODE: 9500
BLOOD UNIT TYPE: NORMAL
BUN SERPL-MCNC: 25 MG/DL (ref 10–30)
BUN SERPL-MCNC: 30 MG/DL (ref 10–30)
BUN SERPL-MCNC: 36 MG/DL (ref 10–30)
BUN SERPL-MCNC: 37 MG/DL (ref 10–30)
BUN SERPL-MCNC: 38 MG/DL (ref 10–30)
BUN SERPL-MCNC: 39 MG/DL (ref 10–30)
BUN SERPL-MCNC: 39 MG/DL (ref 10–30)
BUN SERPL-MCNC: 40 MG/DL (ref 10–30)
BURR CELLS BLD QL SMEAR: ABNORMAL
CALCIUM SERPL-MCNC: 7.9 MG/DL (ref 8.7–10.5)
CALCIUM SERPL-MCNC: 8.2 MG/DL (ref 8.7–10.5)
CALCIUM SERPL-MCNC: 8.2 MG/DL (ref 8.7–10.5)
CALCIUM SERPL-MCNC: 8.3 MG/DL (ref 8.7–10.5)
CALCIUM SERPL-MCNC: 8.5 MG/DL (ref 8.7–10.5)
CALCIUM SERPL-MCNC: 8.5 MG/DL (ref 8.7–10.5)
CALCIUM SERPL-MCNC: 8.6 MG/DL (ref 8.7–10.5)
CALCIUM SERPL-MCNC: 9.1 MG/DL (ref 8.7–10.5)
CHLORIDE SERPL-SCNC: 109 MMOL/L (ref 95–110)
CHLORIDE SERPL-SCNC: 110 MMOL/L (ref 95–110)
CHLORIDE SERPL-SCNC: 111 MMOL/L (ref 95–110)
CHLORIDE SERPL-SCNC: 112 MMOL/L (ref 95–110)
CHLORIDE SERPL-SCNC: 113 MMOL/L (ref 95–110)
CHLORIDE SERPL-SCNC: 116 MMOL/L (ref 95–110)
CHLORIDE SERPL-SCNC: 119 MMOL/L (ref 95–110)
CHLORIDE SERPL-SCNC: 119 MMOL/L (ref 95–110)
CHLORIDE UR-SCNC: 96 MMOL/L (ref 25–200)
CLARITY UR REFRACT.AUTO: ABNORMAL
CLARITY UR REFRACT.AUTO: CLEAR
CO2 SERPL-SCNC: 16 MMOL/L (ref 23–29)
CO2 SERPL-SCNC: 17 MMOL/L (ref 23–29)
CO2 SERPL-SCNC: 21 MMOL/L (ref 23–29)
CO2 SERPL-SCNC: 22 MMOL/L (ref 23–29)
CO2 SERPL-SCNC: 22 MMOL/L (ref 23–29)
CO2 SERPL-SCNC: 23 MMOL/L (ref 23–29)
CO2 SERPL-SCNC: 25 MMOL/L (ref 23–29)
CO2 SERPL-SCNC: 26 MMOL/L (ref 23–29)
CODING SYSTEM: NORMAL
COLOR UR AUTO: YELLOW
COLOR UR AUTO: YELLOW
CREAT SERPL-MCNC: 1.4 MG/DL (ref 0.5–1.4)
CREAT SERPL-MCNC: 1.5 MG/DL (ref 0.5–1.4)
CREAT SERPL-MCNC: 1.6 MG/DL (ref 0.5–1.4)
CREAT SERPL-MCNC: 1.6 MG/DL (ref 0.5–1.4)
CREAT SERPL-MCNC: 1.8 MG/DL (ref 0.5–1.4)
CREAT UR-MCNC: 98 MG/DL (ref 23–375)
CTP QC/QA: YES
DIFFERENTIAL METHOD: ABNORMAL
DISPENSE STATUS: NORMAL
EOSINOPHIL # BLD AUTO: 0 K/UL (ref 0–0.5)
EOSINOPHIL # BLD AUTO: 0 K/UL (ref 0–0.5)
EOSINOPHIL # BLD AUTO: 0.2 K/UL (ref 0–0.5)
EOSINOPHIL # BLD AUTO: 0.2 K/UL (ref 0–0.5)
EOSINOPHIL # BLD AUTO: 0.4 K/UL (ref 0–0.5)
EOSINOPHIL # BLD AUTO: 0.9 K/UL (ref 0–0.5)
EOSINOPHIL NFR BLD: 0.1 % (ref 0–8)
EOSINOPHIL NFR BLD: 0.2 % (ref 0–8)
EOSINOPHIL NFR BLD: 1.2 % (ref 0–8)
EOSINOPHIL NFR BLD: 1.9 % (ref 0–8)
EOSINOPHIL NFR BLD: 3.6 % (ref 0–8)
EOSINOPHIL NFR BLD: 9.5 % (ref 0–8)
ERYTHROCYTE [DISTWIDTH] IN BLOOD BY AUTOMATED COUNT: 13 % (ref 11.5–14.5)
ERYTHROCYTE [DISTWIDTH] IN BLOOD BY AUTOMATED COUNT: 13 % (ref 11.5–14.5)
ERYTHROCYTE [DISTWIDTH] IN BLOOD BY AUTOMATED COUNT: 13.1 % (ref 11.5–14.5)
ERYTHROCYTE [DISTWIDTH] IN BLOOD BY AUTOMATED COUNT: 13.2 % (ref 11.5–14.5)
ERYTHROCYTE [DISTWIDTH] IN BLOOD BY AUTOMATED COUNT: 13.2 % (ref 11.5–14.5)
ERYTHROCYTE [DISTWIDTH] IN BLOOD BY AUTOMATED COUNT: 15.8 % (ref 11.5–14.5)
EST. GFR  (AFRICAN AMERICAN): 35.9 ML/MIN/1.73 M^2
EST. GFR  (AFRICAN AMERICAN): 41.4 ML/MIN/1.73 M^2
EST. GFR  (AFRICAN AMERICAN): 41.4 ML/MIN/1.73 M^2
EST. GFR  (AFRICAN AMERICAN): 44.8 ML/MIN/1.73 M^2
EST. GFR  (AFRICAN AMERICAN): 45 ML/MIN/1.73 M^2
EST. GFR  (AFRICAN AMERICAN): 48.7 ML/MIN/1.73 M^2
EST. GFR  (NON AFRICAN AMERICAN): 31.1 ML/MIN/1.73 M^2
EST. GFR  (NON AFRICAN AMERICAN): 35.8 ML/MIN/1.73 M^2
EST. GFR  (NON AFRICAN AMERICAN): 35.8 ML/MIN/1.73 M^2
EST. GFR  (NON AFRICAN AMERICAN): 38.7 ML/MIN/1.73 M^2
EST. GFR  (NON AFRICAN AMERICAN): 39 ML/MIN/1.73 M^2
EST. GFR  (NON AFRICAN AMERICAN): 42.1 ML/MIN/1.73 M^2
ESTIMATED AVG GLUCOSE: 117 MG/DL (ref 68–131)
GLUCOSE SERPL-MCNC: 120 MG/DL (ref 70–110)
GLUCOSE SERPL-MCNC: 150 MG/DL (ref 70–110)
GLUCOSE SERPL-MCNC: 156 MG/DL (ref 70–110)
GLUCOSE SERPL-MCNC: 167 MG/DL (ref 70–110)
GLUCOSE SERPL-MCNC: 173 MG/DL (ref 70–110)
GLUCOSE SERPL-MCNC: 176 MG/DL (ref 70–110)
GLUCOSE SERPL-MCNC: 183 MG/DL (ref 70–110)
GLUCOSE SERPL-MCNC: 187 MG/DL (ref 70–110)
GLUCOSE SERPL-MCNC: 188 MG/DL (ref 70–110)
GLUCOSE SERPL-MCNC: 211 MG/DL (ref 70–110)
GLUCOSE SERPL-MCNC: 97 MG/DL (ref 70–110)
GLUCOSE UR QL STRIP: NEGATIVE
GLUCOSE UR QL STRIP: NEGATIVE
HBA1C MFR BLD HPLC: 5.7 % (ref 4–5.6)
HCO3 UR-SCNC: 20.4 MMOL/L (ref 24–28)
HCO3 UR-SCNC: 21.1 MMOL/L (ref 24–28)
HCO3 UR-SCNC: 23.1 MMOL/L (ref 24–28)
HCT VFR BLD AUTO: 28.9 % (ref 40–54)
HCT VFR BLD AUTO: 33.4 % (ref 40–54)
HCT VFR BLD AUTO: 34 % (ref 40–54)
HCT VFR BLD AUTO: 35 % (ref 40–54)
HCT VFR BLD AUTO: 35.6 % (ref 40–54)
HCT VFR BLD AUTO: 43.9 % (ref 40–54)
HCT VFR BLD CALC: 23 %PCV (ref 36–54)
HCT VFR BLD CALC: 29 %PCV (ref 36–54)
HCT VFR BLD CALC: 30 %PCV (ref 36–54)
HGB BLD-MCNC: 10.2 G/DL (ref 14–18)
HGB BLD-MCNC: 10.6 G/DL (ref 14–18)
HGB BLD-MCNC: 11.1 G/DL (ref 14–18)
HGB BLD-MCNC: 11.4 G/DL (ref 14–18)
HGB BLD-MCNC: 13.7 G/DL (ref 14–18)
HGB BLD-MCNC: 9.1 G/DL (ref 14–18)
HGB UR QL STRIP: ABNORMAL
HGB UR QL STRIP: NEGATIVE
IMM GRANULOCYTES # BLD AUTO: 0.02 K/UL (ref 0–0.04)
IMM GRANULOCYTES # BLD AUTO: 0.03 K/UL (ref 0–0.04)
IMM GRANULOCYTES # BLD AUTO: 0.04 K/UL (ref 0–0.04)
IMM GRANULOCYTES # BLD AUTO: 0.04 K/UL (ref 0–0.04)
IMM GRANULOCYTES # BLD AUTO: 0.05 K/UL (ref 0–0.04)
IMM GRANULOCYTES # BLD AUTO: 0.05 K/UL (ref 0–0.04)
IMM GRANULOCYTES NFR BLD AUTO: 0.2 % (ref 0–0.5)
IMM GRANULOCYTES NFR BLD AUTO: 0.3 % (ref 0–0.5)
IMM GRANULOCYTES NFR BLD AUTO: 0.3 % (ref 0–0.5)
IMM GRANULOCYTES NFR BLD AUTO: 0.4 % (ref 0–0.5)
IMM GRANULOCYTES NFR BLD AUTO: 0.4 % (ref 0–0.5)
IMM GRANULOCYTES NFR BLD AUTO: 0.5 % (ref 0–0.5)
INR PPP: 1 (ref 0.8–1.2)
INR PPP: 1 (ref 0.8–1.2)
KETONES UR QL STRIP: NEGATIVE
KETONES UR QL STRIP: NEGATIVE
LACTATE SERPL-SCNC: 1.3 MMOL/L (ref 0.5–2.2)
LACTATE SERPL-SCNC: 2.6 MMOL/L (ref 0.5–2.2)
LACTATE SERPL-SCNC: 2.7 MMOL/L (ref 0.5–2.2)
LEUKOCYTE ESTERASE UR QL STRIP: ABNORMAL
LEUKOCYTE ESTERASE UR QL STRIP: NEGATIVE
LYMPHOCYTES # BLD AUTO: 0.4 K/UL (ref 1–4.8)
LYMPHOCYTES # BLD AUTO: 0.7 K/UL (ref 1–4.8)
LYMPHOCYTES # BLD AUTO: 0.8 K/UL (ref 1–4.8)
LYMPHOCYTES # BLD AUTO: 1 K/UL (ref 1–4.8)
LYMPHOCYTES # BLD AUTO: 1.3 K/UL (ref 1–4.8)
LYMPHOCYTES # BLD AUTO: 1.9 K/UL (ref 1–4.8)
LYMPHOCYTES NFR BLD: 12 % (ref 18–48)
LYMPHOCYTES NFR BLD: 18.7 % (ref 18–48)
LYMPHOCYTES NFR BLD: 3.9 % (ref 18–48)
LYMPHOCYTES NFR BLD: 5.6 % (ref 18–48)
LYMPHOCYTES NFR BLD: 6.8 % (ref 18–48)
LYMPHOCYTES NFR BLD: 8.6 % (ref 18–48)
MAGNESIUM SERPL-MCNC: 1.7 MG/DL (ref 1.6–2.6)
MAGNESIUM SERPL-MCNC: 1.8 MG/DL (ref 1.6–2.6)
MAGNESIUM SERPL-MCNC: 1.9 MG/DL (ref 1.6–2.6)
MAGNESIUM SERPL-MCNC: 2.1 MG/DL (ref 1.6–2.6)
MCH RBC QN AUTO: 31 PG (ref 27–31)
MCH RBC QN AUTO: 32.7 PG (ref 27–31)
MCH RBC QN AUTO: 33.3 PG (ref 27–31)
MCH RBC QN AUTO: 33.8 PG (ref 27–31)
MCH RBC QN AUTO: 33.9 PG (ref 27–31)
MCH RBC QN AUTO: 34.8 PG (ref 27–31)
MCHC RBC AUTO-ENTMCNC: 30 G/DL (ref 32–36)
MCHC RBC AUTO-ENTMCNC: 31.2 G/DL (ref 32–36)
MCHC RBC AUTO-ENTMCNC: 31.2 G/DL (ref 32–36)
MCHC RBC AUTO-ENTMCNC: 31.5 G/DL (ref 32–36)
MCHC RBC AUTO-ENTMCNC: 31.7 G/DL (ref 32–36)
MCHC RBC AUTO-ENTMCNC: 32.6 G/DL (ref 32–36)
MCV RBC AUTO: 106 FL (ref 82–98)
MCV RBC AUTO: 107 FL (ref 82–98)
MCV RBC AUTO: 107 FL (ref 82–98)
MCV RBC AUTO: 109 FL (ref 82–98)
MCV RBC AUTO: 109 FL (ref 82–98)
MCV RBC AUTO: 99 FL (ref 82–98)
MICROSCOPIC COMMENT: ABNORMAL
MONOCYTES # BLD AUTO: 0.7 K/UL (ref 0.3–1)
MONOCYTES # BLD AUTO: 0.8 K/UL (ref 0.3–1)
MONOCYTES # BLD AUTO: 0.8 K/UL (ref 0.3–1)
MONOCYTES # BLD AUTO: 0.9 K/UL (ref 0.3–1)
MONOCYTES # BLD AUTO: 1 K/UL (ref 0.3–1)
MONOCYTES # BLD AUTO: 1.4 K/UL (ref 0.3–1)
MONOCYTES NFR BLD: 10.1 % (ref 4–15)
MONOCYTES NFR BLD: 11.6 % (ref 4–15)
MONOCYTES NFR BLD: 6.2 % (ref 4–15)
MONOCYTES NFR BLD: 7 % (ref 4–15)
MONOCYTES NFR BLD: 7.5 % (ref 4–15)
MONOCYTES NFR BLD: 8.7 % (ref 4–15)
NEUTROPHILS # BLD AUTO: 11.8 K/UL (ref 1.8–7.7)
NEUTROPHILS # BLD AUTO: 6.1 K/UL (ref 1.8–7.7)
NEUTROPHILS # BLD AUTO: 8.3 K/UL (ref 1.8–7.7)
NEUTROPHILS # BLD AUTO: 8.7 K/UL (ref 1.8–7.7)
NEUTROPHILS # BLD AUTO: 8.9 K/UL (ref 1.8–7.7)
NEUTROPHILS # BLD AUTO: 9.2 K/UL (ref 1.8–7.7)
NEUTROPHILS NFR BLD: 61.2 % (ref 38–73)
NEUTROPHILS NFR BLD: 76.7 % (ref 38–73)
NEUTROPHILS NFR BLD: 78.9 % (ref 38–73)
NEUTROPHILS NFR BLD: 82.2 % (ref 38–73)
NEUTROPHILS NFR BLD: 86.6 % (ref 38–73)
NEUTROPHILS NFR BLD: 87.8 % (ref 38–73)
NITRITE UR QL STRIP: NEGATIVE
NITRITE UR QL STRIP: POSITIVE
NRBC BLD-RTO: 0 /100 WBC
OVALOCYTES BLD QL SMEAR: ABNORMAL
PCO2 BLDA: 41.3 MMHG (ref 35–45)
PCO2 BLDA: 43.6 MMHG (ref 35–45)
PCO2 BLDA: 46.7 MMHG (ref 35–45)
PH SMN: 7.29 [PH] (ref 7.35–7.45)
PH SMN: 7.3 [PH] (ref 7.35–7.45)
PH SMN: 7.3 [PH] (ref 7.35–7.45)
PH UR STRIP: 5 [PH] (ref 5–8)
PH UR STRIP: 5 [PH] (ref 5–8)
PHOSPHATE SERPL-MCNC: 2 MG/DL (ref 2.7–4.5)
PHOSPHATE SERPL-MCNC: 2.5 MG/DL (ref 2.7–4.5)
PHOSPHATE SERPL-MCNC: 3.8 MG/DL (ref 2.7–4.5)
PHOSPHATE SERPL-MCNC: 4.6 MG/DL (ref 2.7–4.5)
PLATELET # BLD AUTO: 122 K/UL (ref 150–350)
PLATELET # BLD AUTO: 135 K/UL (ref 150–350)
PLATELET # BLD AUTO: 153 K/UL (ref 150–350)
PLATELET # BLD AUTO: 157 K/UL (ref 150–350)
PLATELET # BLD AUTO: 159 K/UL (ref 150–350)
PLATELET # BLD AUTO: 65 K/UL (ref 150–350)
PLATELET BLD QL SMEAR: ABNORMAL
PMV BLD AUTO: 8.8 FL (ref 9.2–12.9)
PMV BLD AUTO: 8.9 FL (ref 9.2–12.9)
PMV BLD AUTO: 9.2 FL (ref 9.2–12.9)
PMV BLD AUTO: 9.2 FL (ref 9.2–12.9)
PMV BLD AUTO: 9.3 FL (ref 9.2–12.9)
PMV BLD AUTO: 9.6 FL (ref 9.2–12.9)
PO2 BLDA: 259 MMHG (ref 80–100)
PO2 BLDA: 267 MMHG (ref 80–100)
PO2 BLDA: 273 MMHG (ref 80–100)
POC BE: -3 MMOL/L
POC BE: -5 MMOL/L
POC BE: -6 MMOL/L
POC IONIZED CALCIUM: 1.21 MMOL/L (ref 1.06–1.42)
POC IONIZED CALCIUM: 1.41 MMOL/L (ref 1.06–1.42)
POC IONIZED CALCIUM: 1.42 MMOL/L (ref 1.06–1.42)
POC SATURATED O2: 100 % (ref 95–100)
POC TCO2: 22 MMOL/L (ref 23–27)
POC TCO2: 22 MMOL/L (ref 23–27)
POC TCO2: 25 MMOL/L (ref 23–27)
POIKILOCYTOSIS BLD QL SMEAR: SLIGHT
POTASSIUM BLD-SCNC: 3.8 MMOL/L (ref 3.5–5.1)
POTASSIUM BLD-SCNC: 4 MMOL/L (ref 3.5–5.1)
POTASSIUM BLD-SCNC: 4.1 MMOL/L (ref 3.5–5.1)
POTASSIUM SERPL-SCNC: 3.6 MMOL/L (ref 3.5–5.1)
POTASSIUM SERPL-SCNC: 3.8 MMOL/L (ref 3.5–5.1)
POTASSIUM SERPL-SCNC: 3.9 MMOL/L (ref 3.5–5.1)
POTASSIUM SERPL-SCNC: 4.2 MMOL/L (ref 3.5–5.1)
POTASSIUM SERPL-SCNC: 4.2 MMOL/L (ref 3.5–5.1)
POTASSIUM SERPL-SCNC: 4.3 MMOL/L (ref 3.5–5.1)
POTASSIUM SERPL-SCNC: 4.5 MMOL/L (ref 3.5–5.1)
POTASSIUM SERPL-SCNC: 4.5 MMOL/L (ref 3.5–5.1)
POTASSIUM UR-SCNC: 51 MMOL/L (ref 15–95)
PREALB SERPL-MCNC: 21 MG/DL (ref 20–43)
PROT SERPL-MCNC: 5.6 G/DL (ref 6–8.4)
PROT SERPL-MCNC: 5.8 G/DL (ref 6–8.4)
PROT SERPL-MCNC: 5.9 G/DL (ref 6–8.4)
PROT SERPL-MCNC: 6.1 G/DL (ref 6–8.4)
PROT SERPL-MCNC: 6.2 G/DL (ref 6–8.4)
PROT SERPL-MCNC: 6.2 G/DL (ref 6–8.4)
PROT UR QL STRIP: NEGATIVE
PROT UR QL STRIP: NEGATIVE
PROTHROMBIN TIME: 10.9 SEC (ref 9–12.5)
PROTHROMBIN TIME: 11.1 SEC (ref 9–12.5)
RBC # BLD AUTO: 2.73 M/UL (ref 4.6–6.2)
RBC # BLD AUTO: 3.12 M/UL (ref 4.6–6.2)
RBC # BLD AUTO: 3.13 M/UL (ref 4.6–6.2)
RBC # BLD AUTO: 3.28 M/UL (ref 4.6–6.2)
RBC # BLD AUTO: 3.28 M/UL (ref 4.6–6.2)
RBC # BLD AUTO: 4.42 M/UL (ref 4.6–6.2)
RBC #/AREA URNS AUTO: 6 /HPF (ref 0–4)
SAMPLE: ABNORMAL
SARS-COV-2 RDRP RESP QL NAA+PROBE: NEGATIVE
SARS-COV-2 RDRP RESP QL NAA+PROBE: NEGATIVE
SODIUM BLD-SCNC: 142 MMOL/L (ref 136–145)
SODIUM BLD-SCNC: 143 MMOL/L (ref 136–145)
SODIUM BLD-SCNC: 145 MMOL/L (ref 136–145)
SODIUM SERPL-SCNC: 143 MMOL/L (ref 136–145)
SODIUM SERPL-SCNC: 143 MMOL/L (ref 136–145)
SODIUM SERPL-SCNC: 144 MMOL/L (ref 136–145)
SODIUM SERPL-SCNC: 144 MMOL/L (ref 136–145)
SODIUM SERPL-SCNC: 145 MMOL/L (ref 136–145)
SODIUM SERPL-SCNC: 146 MMOL/L (ref 136–145)
SODIUM UR-SCNC: 91 MMOL/L (ref 20–250)
SP GR UR STRIP: 1.02 (ref 1–1.03)
SP GR UR STRIP: 1.02 (ref 1–1.03)
SQUAMOUS #/AREA URNS AUTO: 3 /HPF
TRANS ERYTHROCYTES VOL PATIENT: NORMAL ML
TRANSFERRIN SERPL-MCNC: 178 MG/DL (ref 200–375)
TROPONIN I SERPL DL<=0.01 NG/ML-MCNC: 0.02 NG/ML (ref 0–0.03)
TSH SERPL DL<=0.005 MIU/L-ACNC: 1.21 UIU/ML (ref 0.4–4)
URN SPEC COLLECT METH UR: ABNORMAL
URN SPEC COLLECT METH UR: NORMAL
UUN UR-MCNC: 795 MG/DL (ref 140–1050)
WBC # BLD AUTO: 10.11 K/UL (ref 3.9–12.7)
WBC # BLD AUTO: 10.59 K/UL (ref 3.9–12.7)
WBC # BLD AUTO: 10.75 K/UL (ref 3.9–12.7)
WBC # BLD AUTO: 11.62 K/UL (ref 3.9–12.7)
WBC # BLD AUTO: 13.59 K/UL (ref 3.9–12.7)
WBC # BLD AUTO: 9.91 K/UL (ref 3.9–12.7)
WBC #/AREA URNS AUTO: >100 /HPF (ref 0–5)
WBC CLUMPS UR QL AUTO: ABNORMAL

## 2020-01-01 PROCEDURE — D9220A PRA ANESTHESIA: Mod: HCNC,ANES,, | Performed by: ANESTHESIOLOGY

## 2020-01-01 PROCEDURE — 25000003 PHARM REV CODE 250: Mod: HCNC | Performed by: STUDENT IN AN ORGANIZED HEALTH CARE EDUCATION/TRAINING PROGRAM

## 2020-01-01 PROCEDURE — 69210 REMOVE IMPACTED EAR WAX UNI: CPT | Mod: HCNC,S$GLB,, | Performed by: NURSE PRACTITIONER

## 2020-01-01 PROCEDURE — 94761 N-INVAS EAR/PLS OXIMETRY MLT: CPT | Mod: HCNC

## 2020-01-01 PROCEDURE — 63600175 PHARM REV CODE 636 W HCPCS: Mod: HCNC | Performed by: STUDENT IN AN ORGANIZED HEALTH CARE EDUCATION/TRAINING PROGRAM

## 2020-01-01 PROCEDURE — 99999 PR PBB SHADOW E&M-EST. PATIENT-LVL II: ICD-10-PCS | Mod: PBBFAC,HCNC,, | Performed by: DERMATOLOGY

## 2020-01-01 PROCEDURE — 36620 ARTERIAL: ICD-10-PCS | Mod: HCNC,59,, | Performed by: ANESTHESIOLOGY

## 2020-01-01 PROCEDURE — 27000221 HC OXYGEN, UP TO 24 HOURS: Mod: HCNC

## 2020-01-01 PROCEDURE — 87086 URINE CULTURE/COLONY COUNT: CPT | Mod: HCNC

## 2020-01-01 PROCEDURE — 99223 PR INITIAL HOSPITAL CARE,LEVL III: ICD-10-PCS | Mod: AI,HCNC,, | Performed by: ORTHOPAEDIC SURGERY

## 2020-01-01 PROCEDURE — 76942 SUPRAINGUINAL FASCIA ILIACA CATHETER: ICD-10-PCS | Mod: 26,HCNC,, | Performed by: STUDENT IN AN ORGANIZED HEALTH CARE EDUCATION/TRAINING PROGRAM

## 2020-01-01 PROCEDURE — 99499 NO LOS: ICD-10-PCS | Mod: HCNC,S$GLB,, | Performed by: NURSE PRACTITIONER

## 2020-01-01 PROCEDURE — 1126F PR PAIN SEVERITY QUANTIFIED, NO PAIN PRESENT: ICD-10-PCS | Mod: HCNC,S$GLB,, | Performed by: DERMATOLOGY

## 2020-01-01 PROCEDURE — 99212 PR OFFICE/OUTPT VISIT, EST, LEVL II, 10-19 MIN: ICD-10-PCS | Mod: HCNC,S$GLB,, | Performed by: DERMATOLOGY

## 2020-01-01 PROCEDURE — 92134 OCT, RETINA - OU - BOTH EYES: ICD-10-PCS | Mod: HCNC,S$GLB,, | Performed by: OPHTHALMOLOGY

## 2020-01-01 PROCEDURE — 27201423 OPTIME MED/SURG SUP & DEVICES STERILE SUPPLY: Mod: HCNC | Performed by: ORTHOPAEDIC SURGERY

## 2020-01-01 PROCEDURE — 99999 PR PBB SHADOW E&M-EST. PATIENT-LVL III: ICD-10-PCS | Mod: PBBFAC,HCNC,, | Performed by: NURSE PRACTITIONER

## 2020-01-01 PROCEDURE — 92012 PR EYE EXAM, EST PATIENT,INTERMED: ICD-10-PCS | Mod: HCNC,S$GLB,, | Performed by: OPTOMETRIST

## 2020-01-01 PROCEDURE — 92015 DETERMINE REFRACTIVE STATE: CPT | Mod: HCNC,S$GLB,, | Performed by: OPTOMETRIST

## 2020-01-01 PROCEDURE — 96375 TX/PRO/DX INJ NEW DRUG ADDON: CPT | Mod: HCNC

## 2020-01-01 PROCEDURE — 97162 PT EVAL MOD COMPLEX 30 MIN: CPT | Mod: HCNC

## 2020-01-01 PROCEDURE — 83036 HEMOGLOBIN GLYCOSYLATED A1C: CPT | Mod: HCNC

## 2020-01-01 PROCEDURE — 93010 ELECTROCARDIOGRAM REPORT: CPT | Mod: HCNC,,, | Performed by: INTERNAL MEDICINE

## 2020-01-01 PROCEDURE — 83735 ASSAY OF MAGNESIUM: CPT | Mod: HCNC

## 2020-01-01 PROCEDURE — 25000003 PHARM REV CODE 250: Mod: HCNC | Performed by: NURSE PRACTITIONER

## 2020-01-01 PROCEDURE — C1751 CATH, INF, PER/CENT/MIDLINE: HCPCS | Mod: HCNC | Performed by: ANESTHESIOLOGY

## 2020-01-01 PROCEDURE — 12000002 HC ACUTE/MED SURGE SEMI-PRIVATE ROOM: Mod: HCNC

## 2020-01-01 PROCEDURE — 99499 UNLISTED E&M SERVICE: CPT | Mod: HCNC,S$GLB,, | Performed by: AUDIOLOGIST

## 2020-01-01 PROCEDURE — 81001 URINALYSIS AUTO W/SCOPE: CPT | Mod: HCNC

## 2020-01-01 PROCEDURE — 99999 PR PBB SHADOW E&M-EST. PATIENT-LVL II: CPT | Mod: PBBFAC,HCNC,, | Performed by: OPTOMETRIST

## 2020-01-01 PROCEDURE — 81003 URINALYSIS AUTO W/O SCOPE: CPT | Mod: HCNC

## 2020-01-01 PROCEDURE — 85025 COMPLETE CBC W/AUTO DIFF WBC: CPT | Mod: HCNC

## 2020-01-01 PROCEDURE — 93005 ELECTROCARDIOGRAM TRACING: CPT | Mod: HCNC

## 2020-01-01 PROCEDURE — G0180 MD CERTIFICATION HHA PATIENT: HCPCS | Mod: ,,, | Performed by: HOSPITALIST

## 2020-01-01 PROCEDURE — 99499 UNLISTED E&M SERVICE: CPT | Mod: HCNC,S$GLB,, | Performed by: NURSE PRACTITIONER

## 2020-01-01 PROCEDURE — 97530 THERAPEUTIC ACTIVITIES: CPT | Mod: HCNC

## 2020-01-01 PROCEDURE — 92202 PR OPHTHALMOSCOPY, EXT, W/DRAW OPTIC NERVE/MACULA, I&R, UNI/BI: ICD-10-PCS | Mod: HCNC,S$GLB,, | Performed by: OPHTHALMOLOGY

## 2020-01-01 PROCEDURE — 3288F FALL RISK ASSESSMENT DOCD: CPT | Mod: HCNC,CPTII,S$GLB, | Performed by: OPHTHALMOLOGY

## 2020-01-01 PROCEDURE — 80053 COMPREHEN METABOLIC PANEL: CPT | Mod: HCNC

## 2020-01-01 PROCEDURE — 99999 PR PBB SHADOW E&M-EST. PATIENT-LVL II: CPT | Mod: PBBFAC,HCNC,, | Performed by: DERMATOLOGY

## 2020-01-01 PROCEDURE — 99499 NO LOS: ICD-10-PCS | Mod: HCNC,S$GLB,, | Performed by: AUDIOLOGIST

## 2020-01-01 PROCEDURE — 99239 HOSP IP/OBS DSCHRG MGMT >30: CPT | Mod: HCNC,GC,, | Performed by: HOSPITALIST

## 2020-01-01 PROCEDURE — D9220A PRA ANESTHESIA: ICD-10-PCS | Mod: HCNC,ANES,, | Performed by: ANESTHESIOLOGY

## 2020-01-01 PROCEDURE — 36415 COLL VENOUS BLD VENIPUNCTURE: CPT | Mod: HCNC

## 2020-01-01 PROCEDURE — 3288F PR FALLS RISK ASSESSMENT DOCUMENTED: ICD-10-PCS | Mod: HCNC,CPTII,S$GLB, | Performed by: OPHTHALMOLOGY

## 2020-01-01 PROCEDURE — 99291 CRITICAL CARE FIRST HOUR: CPT | Mod: 25,HCNC

## 2020-01-01 PROCEDURE — 99285 EMERGENCY DEPT VISIT HI MDM: CPT | Mod: 25,HCNC

## 2020-01-01 PROCEDURE — 99291 CRITICAL CARE FIRST HOUR: CPT | Mod: ,,, | Performed by: EMERGENCY MEDICINE

## 2020-01-01 PROCEDURE — 87088 URINE BACTERIA CULTURE: CPT | Mod: HCNC

## 2020-01-01 PROCEDURE — 92202 OPSCPY EXTND ON/MAC DRAW: CPT | Mod: HCNC,S$GLB,, | Performed by: OPHTHALMOLOGY

## 2020-01-01 PROCEDURE — 37000008 HC ANESTHESIA 1ST 15 MINUTES: Mod: HCNC | Performed by: ORTHOPAEDIC SURGERY

## 2020-01-01 PROCEDURE — 27134 REVISE HIP JOINT REPLACEMENT: CPT | Mod: 22,HCNC,RT,ICN | Performed by: ORTHOPAEDIC SURGERY

## 2020-01-01 PROCEDURE — 83605 ASSAY OF LACTIC ACID: CPT | Mod: HCNC

## 2020-01-01 PROCEDURE — 82570 ASSAY OF URINE CREATININE: CPT | Mod: HCNC

## 2020-01-01 PROCEDURE — 84134 ASSAY OF PREALBUMIN: CPT | Mod: HCNC

## 2020-01-01 PROCEDURE — 96374 THER/PROPH/DIAG INJ IV PUSH: CPT | Mod: HCNC

## 2020-01-01 PROCEDURE — D9220A PRA ANESTHESIA: Mod: HCNC,CRNA,, | Performed by: NURSE ANESTHETIST, CERTIFIED REGISTERED

## 2020-01-01 PROCEDURE — 96372 THER/PROPH/DIAG INJ SC/IM: CPT | Mod: 59

## 2020-01-01 PROCEDURE — 84300 ASSAY OF URINE SODIUM: CPT | Mod: HCNC

## 2020-01-01 PROCEDURE — 92012 INTRM OPH EXAM EST PATIENT: CPT | Mod: HCNC,S$GLB,, | Performed by: OPTOMETRIST

## 2020-01-01 PROCEDURE — P9021 RED BLOOD CELLS UNIT: HCPCS | Mod: HCNC

## 2020-01-01 PROCEDURE — D9220A PRA ANESTHESIA: ICD-10-PCS | Mod: HCNC,CRNA,, | Performed by: NURSE ANESTHETIST, CERTIFIED REGISTERED

## 2020-01-01 PROCEDURE — 99999 PR PBB SHADOW E&M-EST. PATIENT-LVL I: CPT | Mod: PBBFAC,HCNC,, | Performed by: AUDIOLOGIST

## 2020-01-01 PROCEDURE — 99499 NO LOS: ICD-10-PCS | Mod: HCNC,S$GLB,, | Performed by: OPTOMETRIST

## 2020-01-01 PROCEDURE — 99999 PR PBB SHADOW E&M-EST. PATIENT-LVL I: ICD-10-PCS | Mod: PBBFAC,HCNC,, | Performed by: AUDIOLOGIST

## 2020-01-01 PROCEDURE — 99220 PR INITIAL OBSERVATION CARE,LEVL III: ICD-10-PCS | Mod: HCNC,GC,, | Performed by: HOSPITALIST

## 2020-01-01 PROCEDURE — 99499 UNLISTED E&M SERVICE: CPT | Mod: HCNC,S$GLB,, | Performed by: OPTOMETRIST

## 2020-01-01 PROCEDURE — 97165 OT EVAL LOW COMPLEX 30 MIN: CPT | Mod: HCNC

## 2020-01-01 PROCEDURE — 97535 SELF CARE MNGMENT TRAINING: CPT | Mod: HCNC

## 2020-01-01 PROCEDURE — 71000015 HC POSTOP RECOV 1ST HR: Mod: HCNC | Performed by: ORTHOPAEDIC SURGERY

## 2020-01-01 PROCEDURE — 1100F PR PT FALLS ASSESS DOC 2+ FALLS/FALL W/INJURY/YR: ICD-10-PCS | Mod: HCNC,CPTII,S$GLB, | Performed by: OPHTHALMOLOGY

## 2020-01-01 PROCEDURE — 87040 BLOOD CULTURE FOR BACTERIA: CPT | Mod: 59,HCNC

## 2020-01-01 PROCEDURE — 99999 PR PBB SHADOW E&M-EST. PATIENT-LVL II: ICD-10-PCS | Mod: PBBFAC,HCNC,, | Performed by: OPTOMETRIST

## 2020-01-01 PROCEDURE — 97116 GAIT TRAINING THERAPY: CPT | Mod: HCNC

## 2020-01-01 PROCEDURE — 96365 THER/PROPH/DIAG IV INF INIT: CPT | Mod: HCNC

## 2020-01-01 PROCEDURE — 69210 EAR CERUMEN REMOVAL: ICD-10-PCS | Mod: HCNC,S$GLB,, | Performed by: NURSE PRACTITIONER

## 2020-01-01 PROCEDURE — G0180 PR HOME HEALTH MD CERTIFICATION: ICD-10-PCS | Mod: ,,, | Performed by: HOSPITALIST

## 2020-01-01 PROCEDURE — 36620 INSERTION CATHETER ARTERY: CPT | Mod: HCNC,59,, | Performed by: ANESTHESIOLOGY

## 2020-01-01 PROCEDURE — 99999 PR PBB SHADOW E&M-EST. PATIENT-LVL III: ICD-10-PCS | Mod: PBBFAC,HCNC,, | Performed by: OPHTHALMOLOGY

## 2020-01-01 PROCEDURE — 1101F PR PT FALLS ASSESS DOC 0-1 FALLS W/OUT INJ PAST YR: ICD-10-PCS | Mod: HCNC,CPTII,S$GLB, | Performed by: DERMATOLOGY

## 2020-01-01 PROCEDURE — 99999 PR PBB SHADOW E&M-EST. PATIENT-LVL III: CPT | Mod: PBBFAC,HCNC,, | Performed by: NURSE PRACTITIONER

## 2020-01-01 PROCEDURE — 63600175 PHARM REV CODE 636 W HCPCS: Mod: HCNC | Performed by: NURSE PRACTITIONER

## 2020-01-01 PROCEDURE — 36000710: Mod: HCNC | Performed by: ORTHOPAEDIC SURGERY

## 2020-01-01 PROCEDURE — 76942 ECHO GUIDE FOR BIOPSY: CPT | Mod: HCNC | Performed by: STUDENT IN AN ORGANIZED HEALTH CARE EDUCATION/TRAINING PROGRAM

## 2020-01-01 PROCEDURE — 84100 ASSAY OF PHOSPHORUS: CPT | Mod: 91,HCNC

## 2020-01-01 PROCEDURE — 25000003 PHARM REV CODE 250: Mod: HCNC | Performed by: NURSE ANESTHETIST, CERTIFIED REGISTERED

## 2020-01-01 PROCEDURE — 99900035 HC TECH TIME PER 15 MIN (STAT): Mod: HCNC

## 2020-01-01 PROCEDURE — 1159F PR MEDICATION LIST DOCUMENTED IN MEDICAL RECORD: ICD-10-PCS | Mod: HCNC,S$GLB,, | Performed by: DERMATOLOGY

## 2020-01-01 PROCEDURE — 94640 AIRWAY INHALATION TREATMENT: CPT | Mod: HCNC

## 2020-01-01 PROCEDURE — 84100 ASSAY OF PHOSPHORUS: CPT | Mod: HCNC

## 2020-01-01 PROCEDURE — 63600175 PHARM REV CODE 636 W HCPCS: Mod: HCNC | Performed by: HOSPITALIST

## 2020-01-01 PROCEDURE — 80048 BASIC METABOLIC PNL TOTAL CA: CPT | Mod: HCNC

## 2020-01-01 PROCEDURE — 64999 UNLISTED PX NERVOUS SYSTEM: CPT | Mod: HCNC,,, | Performed by: STUDENT IN AN ORGANIZED HEALTH CARE EDUCATION/TRAINING PROGRAM

## 2020-01-01 PROCEDURE — 92134 CPTRZ OPH DX IMG PST SGM RTA: CPT | Mod: HCNC,S$GLB,, | Performed by: OPHTHALMOLOGY

## 2020-01-01 PROCEDURE — 25000003 PHARM REV CODE 250: Mod: HCNC | Performed by: ORTHOPAEDIC SURGERY

## 2020-01-01 PROCEDURE — 99999 PR PBB SHADOW E&M-EST. PATIENT-LVL I: ICD-10-PCS | Mod: PBBFAC,HCNC,, | Performed by: NURSE PRACTITIONER

## 2020-01-01 PROCEDURE — 86900 BLOOD TYPING SEROLOGIC ABO: CPT | Mod: HCNC

## 2020-01-01 PROCEDURE — 86850 RBC ANTIBODY SCREEN: CPT | Mod: HCNC

## 2020-01-01 PROCEDURE — 71000016 HC POSTOP RECOV ADDL HR: Mod: HCNC | Performed by: ORTHOPAEDIC SURGERY

## 2020-01-01 PROCEDURE — 96374 THER/PROPH/DIAG INJ IV PUSH: CPT | Mod: 59

## 2020-01-01 PROCEDURE — 96367 TX/PROPH/DG ADDL SEQ IV INF: CPT | Mod: HCNC

## 2020-01-01 PROCEDURE — 27201037 HC PRESSURE MONITORING SET UP: Mod: HCNC

## 2020-01-01 PROCEDURE — 99999 PR PBB SHADOW E&M-EST. PATIENT-LVL I: CPT | Mod: PBBFAC,HCNC,, | Performed by: NURSE PRACTITIONER

## 2020-01-01 PROCEDURE — 99223 1ST HOSP IP/OBS HIGH 75: CPT | Mod: AI,HCNC,, | Performed by: ORTHOPAEDIC SURGERY

## 2020-01-01 PROCEDURE — 96376 TX/PRO/DX INJ SAME DRUG ADON: CPT | Mod: HCNC

## 2020-01-01 PROCEDURE — 27507 TREATMENT OF THIGH FRACTURE: CPT | Mod: 51,HCNC,RT,ICN | Performed by: ORTHOPAEDIC SURGERY

## 2020-01-01 PROCEDURE — 99220 PR INITIAL OBSERVATION CARE,LEVL III: CPT | Mod: HCNC,GC,, | Performed by: HOSPITALIST

## 2020-01-01 PROCEDURE — 71000033 HC RECOVERY, INTIAL HOUR: Mod: HCNC | Performed by: ORTHOPAEDIC SURGERY

## 2020-01-01 PROCEDURE — 11000001 HC ACUTE MED/SURG PRIVATE ROOM: Mod: HCNC

## 2020-01-01 PROCEDURE — 64448 NJX AA&/STRD FEM NRV NFS IMG: CPT | Mod: HCNC | Performed by: STUDENT IN AN ORGANIZED HEALTH CARE EDUCATION/TRAINING PROGRAM

## 2020-01-01 PROCEDURE — 85730 THROMBOPLASTIN TIME PARTIAL: CPT | Mod: HCNC

## 2020-01-01 PROCEDURE — 82436 ASSAY OF URINE CHLORIDE: CPT | Mod: HCNC

## 2020-01-01 PROCEDURE — 76942 ECHO GUIDE FOR BIOPSY: CPT | Mod: 26,HCNC,, | Performed by: STUDENT IN AN ORGANIZED HEALTH CARE EDUCATION/TRAINING PROGRAM

## 2020-01-01 PROCEDURE — 63600175 PHARM REV CODE 636 W HCPCS: Mod: HCNC | Performed by: NURSE ANESTHETIST, CERTIFIED REGISTERED

## 2020-01-01 PROCEDURE — 1100F PTFALLS ASSESS-DOCD GE2>/YR: CPT | Mod: HCNC,CPTII,S$GLB, | Performed by: OPHTHALMOLOGY

## 2020-01-01 PROCEDURE — 99212 OFFICE O/P EST SF 10 MIN: CPT | Mod: HCNC,S$GLB,, | Performed by: DERMATOLOGY

## 2020-01-01 PROCEDURE — 92014 COMPRE OPH EXAM EST PT 1/>: CPT | Mod: HCNC,S$GLB,, | Performed by: OPHTHALMOLOGY

## 2020-01-01 PROCEDURE — 84484 ASSAY OF TROPONIN QUANT: CPT | Mod: HCNC

## 2020-01-01 PROCEDURE — 85610 PROTHROMBIN TIME: CPT | Mod: HCNC

## 2020-01-01 PROCEDURE — 87186 SC STD MICRODIL/AGAR DIL: CPT | Mod: HCNC

## 2020-01-01 PROCEDURE — 84466 ASSAY OF TRANSFERRIN: CPT | Mod: HCNC

## 2020-01-01 PROCEDURE — 1126F AMNT PAIN NOTED NONE PRSNT: CPT | Mod: HCNC,S$GLB,, | Performed by: OPHTHALMOLOGY

## 2020-01-01 PROCEDURE — 25000242 PHARM REV CODE 250 ALT 637 W/ HCPCS: Mod: HCNC | Performed by: STUDENT IN AN ORGANIZED HEALTH CARE EDUCATION/TRAINING PROGRAM

## 2020-01-01 PROCEDURE — 27507 PR OPEN RX FEMUR FX+PLATE/SCREW: ICD-10-PCS | Mod: 51,HCNC,RT,ICN | Performed by: ORTHOPAEDIC SURGERY

## 2020-01-01 PROCEDURE — 85025 COMPLETE CBC W/AUTO DIFF WBC: CPT | Mod: 91,HCNC

## 2020-01-01 PROCEDURE — 93010 EKG 12-LEAD: ICD-10-PCS | Mod: HCNC,,, | Performed by: INTERNAL MEDICINE

## 2020-01-01 PROCEDURE — G0378 HOSPITAL OBSERVATION PER HR: HCPCS | Mod: HCNC

## 2020-01-01 PROCEDURE — 64999 SUPRAINGUINAL FASCIA ILIACA CATHETER: ICD-10-PCS | Mod: HCNC,,, | Performed by: STUDENT IN AN ORGANIZED HEALTH CARE EDUCATION/TRAINING PROGRAM

## 2020-01-01 PROCEDURE — 84133 ASSAY OF URINE POTASSIUM: CPT | Mod: HCNC

## 2020-01-01 PROCEDURE — 80053 COMPREHEN METABOLIC PANEL: CPT | Mod: 91,HCNC

## 2020-01-01 PROCEDURE — U0002 COVID-19 LAB TEST NON-CDC: HCPCS | Mod: HCNC

## 2020-01-01 PROCEDURE — 71000039 HC RECOVERY, EACH ADD'L HOUR: Mod: HCNC | Performed by: ORTHOPAEDIC SURGERY

## 2020-01-01 PROCEDURE — 99291 PR CRITICAL CARE, E/M 30-74 MINUTES: ICD-10-PCS | Mod: ,,, | Performed by: EMERGENCY MEDICINE

## 2020-01-01 PROCEDURE — 1159F MED LIST DOCD IN RCRD: CPT | Mod: HCNC,S$GLB,, | Performed by: DERMATOLOGY

## 2020-01-01 PROCEDURE — 84540 ASSAY OF URINE/UREA-N: CPT | Mod: HCNC

## 2020-01-01 PROCEDURE — 99239 PR HOSPITAL DISCHARGE DAY,>30 MIN: ICD-10-PCS | Mod: HCNC,GC,, | Performed by: HOSPITALIST

## 2020-01-01 PROCEDURE — U0002 COVID-19 LAB TEST NON-CDC: HCPCS | Mod: HCNC | Performed by: NURSE PRACTITIONER

## 2020-01-01 PROCEDURE — 96375 TX/PRO/DX INJ NEW DRUG ADDON: CPT

## 2020-01-01 PROCEDURE — 92014 PR EYE EXAM, EST PATIENT,COMPREHESV: ICD-10-PCS | Mod: HCNC,S$GLB,, | Performed by: OPHTHALMOLOGY

## 2020-01-01 PROCEDURE — 36000711: Mod: HCNC | Performed by: ORTHOPAEDIC SURGERY

## 2020-01-01 PROCEDURE — 1126F PR PAIN SEVERITY QUANTIFIED, NO PAIN PRESENT: ICD-10-PCS | Mod: HCNC,S$GLB,, | Performed by: OPHTHALMOLOGY

## 2020-01-01 PROCEDURE — 99233 PR SUBSEQUENT HOSPITAL CARE,LEVL III: ICD-10-PCS | Mod: HCNC,GC,, | Performed by: HOSPITALIST

## 2020-01-01 PROCEDURE — 92015 PR REFRACTION: ICD-10-PCS | Mod: HCNC,S$GLB,, | Performed by: OPTOMETRIST

## 2020-01-01 PROCEDURE — 25000242 PHARM REV CODE 250 ALT 637 W/ HCPCS: Mod: HCNC | Performed by: NURSE ANESTHETIST, CERTIFIED REGISTERED

## 2020-01-01 PROCEDURE — 96366 THER/PROPH/DIAG IV INF ADDON: CPT

## 2020-01-01 PROCEDURE — 99233 SBSQ HOSP IP/OBS HIGH 50: CPT | Mod: HCNC,GC,, | Performed by: HOSPITALIST

## 2020-01-01 PROCEDURE — 37000009 HC ANESTHESIA EA ADD 15 MINS: Mod: HCNC | Performed by: ORTHOPAEDIC SURGERY

## 2020-01-01 PROCEDURE — 1101F PT FALLS ASSESS-DOCD LE1/YR: CPT | Mod: HCNC,CPTII,S$GLB, | Performed by: DERMATOLOGY

## 2020-01-01 PROCEDURE — 84443 ASSAY THYROID STIM HORMONE: CPT | Mod: HCNC

## 2020-01-01 PROCEDURE — 86920 COMPATIBILITY TEST SPIN: CPT | Mod: HCNC

## 2020-01-01 PROCEDURE — 99999 PR PBB SHADOW E&M-EST. PATIENT-LVL III: CPT | Mod: PBBFAC,HCNC,, | Performed by: OPHTHALMOLOGY

## 2020-01-01 PROCEDURE — 96376 TX/PRO/DX INJ SAME DRUG ADON: CPT

## 2020-01-01 PROCEDURE — 87077 CULTURE AEROBIC IDENTIFY: CPT | Mod: HCNC

## 2020-01-01 PROCEDURE — 83735 ASSAY OF MAGNESIUM: CPT | Mod: 91,HCNC

## 2020-01-01 PROCEDURE — C1776 JOINT DEVICE (IMPLANTABLE): HCPCS | Mod: HCNC | Performed by: ORTHOPAEDIC SURGERY

## 2020-01-01 PROCEDURE — 1126F AMNT PAIN NOTED NONE PRSNT: CPT | Mod: HCNC,S$GLB,, | Performed by: DERMATOLOGY

## 2020-01-01 PROCEDURE — 27134 PR REVISE TOTAL HIP REPLACEMENT: ICD-10-PCS | Mod: 22,HCNC,RT,ICN | Performed by: ORTHOPAEDIC SURGERY

## 2020-01-01 DEVICE — HEAD FEMORAL V40 +7.5X36MM: Type: IMPLANTABLE DEVICE | Site: HIP | Status: FUNCTIONAL

## 2020-01-01 RX ORDER — SULFAMETHOXAZOLE AND TRIMETHOPRIM 800; 160 MG/1; MG/1
1 TABLET ORAL 2 TIMES DAILY
Status: DISCONTINUED | OUTPATIENT
Start: 2020-01-01 | End: 2020-01-01 | Stop reason: HOSPADM

## 2020-01-01 RX ORDER — EPHEDRINE SULFATE 50 MG/ML
INJECTION, SOLUTION INTRAVENOUS
Status: DISCONTINUED | OUTPATIENT
Start: 2020-01-01 | End: 2020-01-01

## 2020-01-01 RX ORDER — ONDANSETRON 8 MG/1
8 TABLET, ORALLY DISINTEGRATING ORAL EVERY 8 HOURS PRN
Status: DISCONTINUED | OUTPATIENT
Start: 2020-01-01 | End: 2020-01-01 | Stop reason: HOSPADM

## 2020-01-01 RX ORDER — GLUCAGON 1 MG
1 KIT INJECTION
Status: DISCONTINUED | OUTPATIENT
Start: 2020-01-01 | End: 2020-01-01 | Stop reason: HOSPADM

## 2020-01-01 RX ORDER — ALBUTEROL SULFATE 90 UG/1
AEROSOL, METERED RESPIRATORY (INHALATION)
Status: DISCONTINUED | OUTPATIENT
Start: 2020-01-01 | End: 2020-01-01

## 2020-01-01 RX ORDER — ACETAMINOPHEN 500 MG
1000 TABLET ORAL EVERY 6 HOURS
Status: DISPENSED | OUTPATIENT
Start: 2020-01-01 | End: 2021-01-01

## 2020-01-01 RX ORDER — ROPIVACAINE HYDROCHLORIDE 2 MG/ML
2 INJECTION, SOLUTION EPIDURAL; INFILTRATION; PERINEURAL CONTINUOUS
Status: DISCONTINUED | OUTPATIENT
Start: 2020-01-01 | End: 2021-01-01 | Stop reason: HOSPADM

## 2020-01-01 RX ORDER — FENTANYL CITRATE 50 UG/ML
25 INJECTION, SOLUTION INTRAMUSCULAR; INTRAVENOUS EVERY 5 MIN PRN
Status: DISCONTINUED | OUTPATIENT
Start: 2020-01-01 | End: 2020-01-01 | Stop reason: HOSPADM

## 2020-01-01 RX ORDER — SODIUM CHLORIDE 9 MG/ML
INJECTION, SOLUTION INTRAVENOUS
Status: DISCONTINUED | OUTPATIENT
Start: 2020-01-01 | End: 2020-01-01

## 2020-01-01 RX ORDER — ROPIVACAINE HYDROCHLORIDE 2 MG/ML
10 INJECTION, SOLUTION EPIDURAL; INFILTRATION; PERINEURAL CONTINUOUS
Status: DISCONTINUED | OUTPATIENT
Start: 2020-01-01 | End: 2021-01-01 | Stop reason: HOSPADM

## 2020-01-01 RX ORDER — ONDANSETRON 8 MG/1
8 TABLET, ORALLY DISINTEGRATING ORAL EVERY 8 HOURS PRN
Status: DISCONTINUED | OUTPATIENT
Start: 2020-01-01 | End: 2021-01-01 | Stop reason: HOSPADM

## 2020-01-01 RX ORDER — MUPIROCIN 20 MG/G
1 OINTMENT TOPICAL 2 TIMES DAILY
Status: DISCONTINUED | OUTPATIENT
Start: 2020-01-01 | End: 2021-01-01

## 2020-01-01 RX ORDER — ACETAMINOPHEN 10 MG/ML
1000 INJECTION, SOLUTION INTRAVENOUS ONCE
Status: COMPLETED | OUTPATIENT
Start: 2020-01-01 | End: 2020-01-01

## 2020-01-01 RX ORDER — MORPHINE SULFATE 2 MG/ML
2 INJECTION, SOLUTION INTRAMUSCULAR; INTRAVENOUS
Status: DISCONTINUED | OUTPATIENT
Start: 2020-01-01 | End: 2021-01-01 | Stop reason: HOSPADM

## 2020-01-01 RX ORDER — MAGNESIUM SULFATE HEPTAHYDRATE 40 MG/ML
2 INJECTION, SOLUTION INTRAVENOUS ONCE
Status: COMPLETED | OUTPATIENT
Start: 2020-01-01 | End: 2020-01-01

## 2020-01-01 RX ORDER — AMOXICILLIN 250 MG
1 CAPSULE ORAL 2 TIMES DAILY
Status: DISCONTINUED | OUTPATIENT
Start: 2020-01-01 | End: 2021-01-01 | Stop reason: HOSPADM

## 2020-01-01 RX ORDER — BISACODYL 10 MG
10 SUPPOSITORY, RECTAL RECTAL DAILY PRN
Status: DISCONTINUED | OUTPATIENT
Start: 2020-01-01 | End: 2021-01-01 | Stop reason: HOSPADM

## 2020-01-01 RX ORDER — DEXAMETHASONE SODIUM PHOSPHATE 4 MG/ML
INJECTION, SOLUTION INTRA-ARTICULAR; INTRALESIONAL; INTRAMUSCULAR; INTRAVENOUS; SOFT TISSUE
Status: DISCONTINUED | OUTPATIENT
Start: 2020-01-01 | End: 2020-01-01

## 2020-01-01 RX ORDER — ALBUTEROL SULFATE 2.5 MG/.5ML
2.5 SOLUTION RESPIRATORY (INHALATION) EVERY 4 HOURS
Status: DISCONTINUED | OUTPATIENT
Start: 2020-01-01 | End: 2021-01-01

## 2020-01-01 RX ORDER — LIDOCAINE HYDROCHLORIDE 10 MG/ML
1 INJECTION, SOLUTION EPIDURAL; INFILTRATION; INTRACAUDAL; PERINEURAL
Status: DISCONTINUED | OUTPATIENT
Start: 2020-01-01 | End: 2020-01-01 | Stop reason: HOSPADM

## 2020-01-01 RX ORDER — TALC
6 POWDER (GRAM) TOPICAL NIGHTLY PRN
Status: DISCONTINUED | OUTPATIENT
Start: 2020-01-01 | End: 2020-01-01 | Stop reason: HOSPADM

## 2020-01-01 RX ORDER — ENOXAPARIN SODIUM 100 MG/ML
40 INJECTION SUBCUTANEOUS EVERY 24 HOURS
Status: DISCONTINUED | OUTPATIENT
Start: 2020-01-01 | End: 2021-01-01

## 2020-01-01 RX ORDER — TRANEXAMIC ACID 100 MG/ML
1000 INJECTION, SOLUTION INTRAVENOUS
Status: COMPLETED | OUTPATIENT
Start: 2020-01-01 | End: 2020-01-01

## 2020-01-01 RX ORDER — OXYCODONE HYDROCHLORIDE 5 MG/1
5 TABLET ORAL EVERY 4 HOURS PRN
Status: DISCONTINUED | OUTPATIENT
Start: 2020-01-01 | End: 2020-01-01

## 2020-01-01 RX ORDER — SODIUM CHLORIDE 0.9 % (FLUSH) 0.9 %
10 SYRINGE (ML) INJECTION
Status: DISCONTINUED | OUTPATIENT
Start: 2020-01-01 | End: 2021-01-01 | Stop reason: HOSPADM

## 2020-01-01 RX ORDER — METHOCARBAMOL 500 MG/1
500 TABLET, FILM COATED ORAL EVERY 6 HOURS PRN
Status: DISCONTINUED | OUTPATIENT
Start: 2020-01-01 | End: 2021-01-01

## 2020-01-01 RX ORDER — SODIUM CHLORIDE 9 MG/ML
INJECTION, SOLUTION INTRAVENOUS CONTINUOUS
Status: DISCONTINUED | OUTPATIENT
Start: 2020-01-01 | End: 2021-01-01

## 2020-01-01 RX ORDER — CEFTRIAXONE 1 G/1
1 INJECTION, POWDER, FOR SOLUTION INTRAMUSCULAR; INTRAVENOUS
Status: DISCONTINUED | OUTPATIENT
Start: 2020-01-01 | End: 2020-01-01 | Stop reason: HOSPADM

## 2020-01-01 RX ORDER — CEFAZOLIN SODIUM 1 G/3ML
2 INJECTION, POWDER, FOR SOLUTION INTRAMUSCULAR; INTRAVENOUS
Status: COMPLETED | OUTPATIENT
Start: 2021-01-01 | End: 2021-01-01

## 2020-01-01 RX ORDER — ROCURONIUM BROMIDE 10 MG/ML
INJECTION, SOLUTION INTRAVENOUS
Status: DISCONTINUED | OUTPATIENT
Start: 2020-01-01 | End: 2020-01-01

## 2020-01-01 RX ORDER — IBUPROFEN 200 MG
16 TABLET ORAL
Status: DISCONTINUED | OUTPATIENT
Start: 2020-01-01 | End: 2020-01-01 | Stop reason: HOSPADM

## 2020-01-01 RX ORDER — ONDANSETRON 2 MG/ML
4 INJECTION INTRAMUSCULAR; INTRAVENOUS
Status: COMPLETED | OUTPATIENT
Start: 2020-01-01 | End: 2020-01-01

## 2020-01-01 RX ORDER — MUPIROCIN 20 MG/G
1 OINTMENT TOPICAL
Status: DISCONTINUED | OUTPATIENT
Start: 2020-01-01 | End: 2020-01-01 | Stop reason: HOSPADM

## 2020-01-01 RX ORDER — LIDOCAINE HYDROCHLORIDE 20 MG/ML
INJECTION INTRAVENOUS
Status: DISCONTINUED | OUTPATIENT
Start: 2020-01-01 | End: 2020-01-01

## 2020-01-01 RX ORDER — NEOSTIGMINE METHYLSULFATE 0.5 MG/ML
INJECTION, SOLUTION INTRAVENOUS
Status: DISCONTINUED | OUTPATIENT
Start: 2020-01-01 | End: 2020-01-01

## 2020-01-01 RX ORDER — CEFTRIAXONE 1 G/1
1 INJECTION, POWDER, FOR SOLUTION INTRAMUSCULAR; INTRAVENOUS
Status: DISCONTINUED | OUTPATIENT
Start: 2020-01-01 | End: 2020-01-01

## 2020-01-01 RX ORDER — SODIUM CHLORIDE 0.9 % (FLUSH) 0.9 %
10 SYRINGE (ML) INJECTION
Status: DISCONTINUED | OUTPATIENT
Start: 2020-01-01 | End: 2020-01-01 | Stop reason: HOSPADM

## 2020-01-01 RX ORDER — ACETAMINOPHEN 325 MG/1
650 TABLET ORAL EVERY 4 HOURS PRN
Status: DISCONTINUED | OUTPATIENT
Start: 2020-01-01 | End: 2020-01-01 | Stop reason: HOSPADM

## 2020-01-01 RX ORDER — KETAMINE HCL IN 0.9 % NACL 50 MG/5 ML
SYRINGE (ML) INTRAVENOUS
Status: DISCONTINUED | OUTPATIENT
Start: 2020-01-01 | End: 2020-01-01

## 2020-01-01 RX ORDER — HEPARIN SODIUM 5000 [USP'U]/ML
5000 INJECTION, SOLUTION INTRAVENOUS; SUBCUTANEOUS EVERY 8 HOURS
Status: DISCONTINUED | OUTPATIENT
Start: 2020-01-01 | End: 2020-01-01 | Stop reason: HOSPADM

## 2020-01-01 RX ORDER — POLYETHYLENE GLYCOL 3350 17 G/17G
17 POWDER, FOR SOLUTION ORAL DAILY
Status: DISCONTINUED | OUTPATIENT
Start: 2021-01-01 | End: 2021-01-01

## 2020-01-01 RX ORDER — IBUPROFEN 200 MG
24 TABLET ORAL
Status: DISCONTINUED | OUTPATIENT
Start: 2020-01-01 | End: 2020-01-01 | Stop reason: HOSPADM

## 2020-01-01 RX ORDER — FENTANYL CITRATE 50 UG/ML
INJECTION, SOLUTION INTRAMUSCULAR; INTRAVENOUS
Status: DISCONTINUED | OUTPATIENT
Start: 2020-01-01 | End: 2020-01-01

## 2020-01-01 RX ORDER — ACETAMINOPHEN 325 MG/1
650 TABLET ORAL EVERY 8 HOURS PRN
Status: DISCONTINUED | OUTPATIENT
Start: 2020-01-01 | End: 2021-01-01

## 2020-01-01 RX ORDER — TALC
6 POWDER (GRAM) TOPICAL NIGHTLY PRN
Status: DISCONTINUED | OUTPATIENT
Start: 2020-01-01 | End: 2020-01-01

## 2020-01-01 RX ORDER — SULFAMETHOXAZOLE AND TRIMETHOPRIM 800; 160 MG/1; MG/1
1 TABLET ORAL 2 TIMES DAILY
Qty: 9 TABLET | Refills: 0 | Status: SHIPPED | OUTPATIENT
Start: 2020-01-01 | End: 2020-01-01

## 2020-01-01 RX ORDER — CIPROFLOXACIN 750 MG/1
750 TABLET, FILM COATED ORAL EVERY 24 HOURS
Status: DISCONTINUED | OUTPATIENT
Start: 2020-01-01 | End: 2020-01-01

## 2020-01-01 RX ORDER — PROMETHAZINE HYDROCHLORIDE 25 MG/1
25 TABLET ORAL EVERY 6 HOURS PRN
Status: DISCONTINUED | OUTPATIENT
Start: 2020-01-01 | End: 2020-01-01 | Stop reason: HOSPADM

## 2020-01-01 RX ORDER — HYDROMORPHONE HYDROCHLORIDE 1 MG/ML
1 INJECTION, SOLUTION INTRAMUSCULAR; INTRAVENOUS; SUBCUTANEOUS EVERY 4 HOURS PRN
Status: DISCONTINUED | OUTPATIENT
Start: 2020-01-01 | End: 2021-01-01

## 2020-01-01 RX ORDER — FENTANYL CITRATE 50 UG/ML
50 INJECTION, SOLUTION INTRAMUSCULAR; INTRAVENOUS ONCE
Status: COMPLETED | OUTPATIENT
Start: 2020-01-01 | End: 2020-01-01

## 2020-01-01 RX ORDER — MORPHINE SULFATE 2 MG/ML
6 INJECTION, SOLUTION INTRAMUSCULAR; INTRAVENOUS
Status: COMPLETED | OUTPATIENT
Start: 2020-01-01 | End: 2020-01-01

## 2020-01-01 RX ORDER — FAMOTIDINE 20 MG/1
20 TABLET, FILM COATED ORAL DAILY
Status: DISCONTINUED | OUTPATIENT
Start: 2020-01-01 | End: 2020-01-01 | Stop reason: HOSPADM

## 2020-01-01 RX ORDER — EPINEPHRINE 1 MG/ML
INJECTION, SOLUTION INTRACARDIAC; INTRAMUSCULAR; INTRAVENOUS; SUBCUTANEOUS
Status: DISCONTINUED | OUTPATIENT
Start: 2020-01-01 | End: 2020-01-01

## 2020-01-01 RX ORDER — CELECOXIB 200 MG/1
400 CAPSULE ORAL
Status: DISCONTINUED | OUTPATIENT
Start: 2020-01-01 | End: 2020-01-01 | Stop reason: HOSPADM

## 2020-01-01 RX ORDER — SODIUM CHLORIDE 0.9 % (FLUSH) 0.9 %
10 SYRINGE (ML) INJECTION
Status: DISCONTINUED | OUTPATIENT
Start: 2020-01-01 | End: 2021-01-01

## 2020-01-01 RX ORDER — ONDANSETRON 2 MG/ML
INJECTION INTRAMUSCULAR; INTRAVENOUS
Status: DISCONTINUED | OUTPATIENT
Start: 2020-01-01 | End: 2020-01-01

## 2020-01-01 RX ORDER — PHENYLEPHRINE HYDROCHLORIDE 10 MG/ML
INJECTION INTRAVENOUS
Status: DISCONTINUED | OUTPATIENT
Start: 2020-01-01 | End: 2020-01-01

## 2020-01-01 RX ORDER — OXYCODONE HYDROCHLORIDE 10 MG/1
10 TABLET ORAL EVERY 4 HOURS PRN
Status: DISCONTINUED | OUTPATIENT
Start: 2020-01-01 | End: 2020-01-01

## 2020-01-01 RX ORDER — LIDOCAINE HYDROCHLORIDE 10 MG/ML
1 INJECTION, SOLUTION EPIDURAL; INFILTRATION; INTRACAUDAL; PERINEURAL ONCE
Status: DISCONTINUED | OUTPATIENT
Start: 2020-01-01 | End: 2020-01-01

## 2020-01-01 RX ORDER — PREGABALIN 75 MG/1
75 CAPSULE ORAL
Status: DISCONTINUED | OUTPATIENT
Start: 2020-01-01 | End: 2020-01-01 | Stop reason: HOSPADM

## 2020-01-01 RX ORDER — MIDAZOLAM HYDROCHLORIDE 1 MG/ML
0.5 INJECTION INTRAMUSCULAR; INTRAVENOUS
Status: DISCONTINUED | OUTPATIENT
Start: 2020-01-01 | End: 2020-01-01 | Stop reason: HOSPADM

## 2020-01-01 RX ORDER — ACETAMINOPHEN 500 MG
1000 TABLET ORAL EVERY 6 HOURS
Status: DISPENSED | OUTPATIENT
Start: 2021-01-01 | End: 2021-01-01

## 2020-01-01 RX ORDER — VANCOMYCIN HCL IN 5 % DEXTROSE 1G/250ML
1000 PLASTIC BAG, INJECTION (ML) INTRAVENOUS
Status: DISCONTINUED | OUTPATIENT
Start: 2020-01-01 | End: 2020-01-01

## 2020-01-01 RX ORDER — SODIUM,POTASSIUM PHOSPHATES 280-250MG
1 POWDER IN PACKET (EA) ORAL ONCE
Status: COMPLETED | OUTPATIENT
Start: 2020-01-01 | End: 2020-01-01

## 2020-01-01 RX ORDER — CEFAZOLIN SODIUM 1 G/3ML
2 INJECTION, POWDER, FOR SOLUTION INTRAMUSCULAR; INTRAVENOUS
Status: COMPLETED | OUTPATIENT
Start: 2020-01-01 | End: 2020-01-01

## 2020-01-01 RX ORDER — ONDANSETRON 2 MG/ML
4 INJECTION INTRAMUSCULAR; INTRAVENOUS EVERY 12 HOURS PRN
Status: DISCONTINUED | OUTPATIENT
Start: 2020-01-01 | End: 2021-01-01

## 2020-01-01 RX ORDER — METHOCARBAMOL 500 MG/1
500 TABLET, FILM COATED ORAL 4 TIMES DAILY
Status: DISCONTINUED | OUTPATIENT
Start: 2020-01-01 | End: 2021-01-01

## 2020-01-01 RX ORDER — ACETAMINOPHEN 325 MG/1
650 TABLET ORAL EVERY 6 HOURS
Status: DISCONTINUED | OUTPATIENT
Start: 2020-01-01 | End: 2020-01-01

## 2020-01-01 RX ORDER — MUPIROCIN 20 MG/G
OINTMENT TOPICAL
Status: DISCONTINUED | OUTPATIENT
Start: 2020-01-01 | End: 2020-01-01 | Stop reason: HOSPADM

## 2020-01-01 RX ORDER — PROPOFOL 10 MG/ML
VIAL (ML) INTRAVENOUS
Status: DISCONTINUED | OUTPATIENT
Start: 2020-01-01 | End: 2020-01-01

## 2020-01-01 RX ORDER — IPRATROPIUM BROMIDE AND ALBUTEROL SULFATE 2.5; .5 MG/3ML; MG/3ML
3 SOLUTION RESPIRATORY (INHALATION) EVERY 4 HOURS PRN
Status: DISCONTINUED | OUTPATIENT
Start: 2020-01-01 | End: 2020-01-01 | Stop reason: HOSPADM

## 2020-01-01 RX ORDER — OXYCODONE HYDROCHLORIDE 10 MG/1
10 TABLET ORAL
Status: DISCONTINUED | OUTPATIENT
Start: 2020-01-01 | End: 2021-01-01

## 2020-01-01 RX ORDER — BUPIVACAINE HYDROCHLORIDE AND EPINEPHRINE 2.5; 5 MG/ML; UG/ML
INJECTION, SOLUTION EPIDURAL; INFILTRATION; INTRACAUDAL; PERINEURAL
Status: COMPLETED | OUTPATIENT
Start: 2020-01-01 | End: 2020-01-01

## 2020-01-01 RX ORDER — OXYCODONE HYDROCHLORIDE 5 MG/1
5 TABLET ORAL
Status: DISCONTINUED | OUTPATIENT
Start: 2020-01-01 | End: 2021-01-01 | Stop reason: HOSPADM

## 2020-01-01 RX ORDER — POLYETHYLENE GLYCOL 3350 17 G/17G
17 POWDER, FOR SOLUTION ORAL DAILY
Status: DISCONTINUED | OUTPATIENT
Start: 2020-01-01 | End: 2020-01-01 | Stop reason: HOSPADM

## 2020-01-01 RX ADMIN — EPHEDRINE SULFATE 25 MG: 50 INJECTION INTRAVENOUS at 04:12

## 2020-01-01 RX ADMIN — TRANEXAMIC ACID 1000 MG: 100 INJECTION, SOLUTION INTRAVENOUS at 12:12

## 2020-01-01 RX ADMIN — ROPIVACAINE HYDROCHLORIDE 2 ML/HR: 2 INJECTION, SOLUTION EPIDURAL; INFILTRATION at 06:12

## 2020-01-01 RX ADMIN — PHENYLEPHRINE HYDROCHLORIDE 100 MCG: 10 INJECTION INTRAVENOUS at 04:12

## 2020-01-01 RX ADMIN — ROCURONIUM BROMIDE 20 MG: 10 INJECTION, SOLUTION INTRAVENOUS at 02:12

## 2020-01-01 RX ADMIN — MUPIROCIN: 20 OINTMENT TOPICAL at 10:12

## 2020-01-01 RX ADMIN — EPHEDRINE SULFATE 5 MG: 50 INJECTION INTRAVENOUS at 05:12

## 2020-01-01 RX ADMIN — PHENYLEPHRINE HYDROCHLORIDE 300 MCG: 10 INJECTION INTRAVENOUS at 11:12

## 2020-01-01 RX ADMIN — EPHEDRINE SULFATE 25 MG: 50 INJECTION INTRAVENOUS at 11:12

## 2020-01-01 RX ADMIN — ALBUTEROL SULFATE 2.5 MG: 2.5 SOLUTION RESPIRATORY (INHALATION) at 09:12

## 2020-01-01 RX ADMIN — SODIUM CHLORIDE 100 ML/HR: 0.9 INJECTION, SOLUTION INTRAVENOUS at 11:12

## 2020-01-01 RX ADMIN — EPHEDRINE SULFATE 5 MG: 50 INJECTION INTRAVENOUS at 02:12

## 2020-01-01 RX ADMIN — BUPIVACAINE HYDROCHLORIDE AND EPINEPHRINE BITARTRATE 40 ML: 2.5; .0091 INJECTION, SOLUTION EPIDURAL; INFILTRATION; INTRACAUDAL; PERINEURAL at 12:12

## 2020-01-01 RX ADMIN — CEFTRIAXONE SODIUM 1 G: 1 INJECTION, POWDER, FOR SOLUTION INTRAMUSCULAR; INTRAVENOUS at 11:06

## 2020-01-01 RX ADMIN — ONDANSETRON 4 MG: 2 INJECTION, SOLUTION INTRAMUSCULAR; INTRAVENOUS at 04:12

## 2020-01-01 RX ADMIN — EPINEPHRINE 10 MCG: 1 INJECTION, SOLUTION INTRAMUSCULAR; SUBCUTANEOUS at 02:12

## 2020-01-01 RX ADMIN — LIDOCAINE HYDROCHLORIDE 100 MG: 20 INJECTION, SOLUTION INTRAVENOUS at 11:12

## 2020-01-01 RX ADMIN — PHENYLEPHRINE HYDROCHLORIDE 100 MCG: 10 INJECTION INTRAVENOUS at 03:12

## 2020-01-01 RX ADMIN — PIPERACILLIN AND TAZOBACTAM 4.5 G: 4; .5 INJECTION, POWDER, LYOPHILIZED, FOR SOLUTION INTRAVENOUS; PARENTERAL at 03:06

## 2020-01-01 RX ADMIN — POTASSIUM & SODIUM PHOSPHATES POWDER PACK 280-160-250 MG 1 PACKET: 280-160-250 PACK at 10:06

## 2020-01-01 RX ADMIN — DEXAMETHASONE SODIUM PHOSPHATE 4 MG: 4 INJECTION, SOLUTION INTRAMUSCULAR; INTRAVENOUS at 03:12

## 2020-01-01 RX ADMIN — POLYETHYLENE GLYCOL 3350 17 G: 17 POWDER, FOR SOLUTION ORAL at 10:06

## 2020-01-01 RX ADMIN — SODIUM CHLORIDE 2721 ML: 0.9 INJECTION, SOLUTION INTRAVENOUS at 03:06

## 2020-01-01 RX ADMIN — CALCIUM CHLORIDE 0.2 G: 100 INJECTION, SOLUTION INTRAVENOUS at 03:12

## 2020-01-01 RX ADMIN — Medication 10 MG: at 01:12

## 2020-01-01 RX ADMIN — SODIUM CHLORIDE 0.5 MCG/KG/MIN: 9 INJECTION, SOLUTION INTRAVENOUS at 12:12

## 2020-01-01 RX ADMIN — HEPARIN SODIUM 5000 UNITS: 5000 INJECTION INTRAVENOUS; SUBCUTANEOUS at 10:06

## 2020-01-01 RX ADMIN — FENTANYL CITRATE 25 MCG: 50 INJECTION, SOLUTION INTRAMUSCULAR; INTRAVENOUS at 04:12

## 2020-01-01 RX ADMIN — OXYCODONE 5 MG: 5 TABLET ORAL at 06:12

## 2020-01-01 RX ADMIN — CEFAZOLIN 2 G: 330 INJECTION, POWDER, FOR SOLUTION INTRAMUSCULAR; INTRAVENOUS at 12:12

## 2020-01-01 RX ADMIN — ROCURONIUM BROMIDE 20 MG: 10 INJECTION, SOLUTION INTRAVENOUS at 12:12

## 2020-01-01 RX ADMIN — PHENYLEPHRINE HYDROCHLORIDE 100 MCG: 10 INJECTION INTRAVENOUS at 12:12

## 2020-01-01 RX ADMIN — SULFAMETHOXAZOLE AND TRIMETHOPRIM 1 TABLET: 800; 160 TABLET ORAL at 05:06

## 2020-01-01 RX ADMIN — SODIUM CHLORIDE: 0.9 INJECTION, SOLUTION INTRAVENOUS at 02:12

## 2020-01-01 RX ADMIN — VANCOMYCIN HYDROCHLORIDE 1500 MG: 1.5 INJECTION, POWDER, LYOPHILIZED, FOR SOLUTION INTRAVENOUS at 10:12

## 2020-01-01 RX ADMIN — CEFTRIAXONE SODIUM 1 G: 1 INJECTION, POWDER, FOR SOLUTION INTRAMUSCULAR; INTRAVENOUS at 09:06

## 2020-01-01 RX ADMIN — PHENYLEPHRINE HYDROCHLORIDE 200 MCG: 10 INJECTION INTRAVENOUS at 12:12

## 2020-01-01 RX ADMIN — EPHEDRINE SULFATE 10 MG: 50 INJECTION INTRAVENOUS at 12:12

## 2020-01-01 RX ADMIN — GLYCOPYRROLATE 0.4 MG: 0.2 INJECTION INTRAMUSCULAR; INTRAVENOUS at 05:12

## 2020-01-01 RX ADMIN — EPHEDRINE SULFATE 5 MG: 50 INJECTION INTRAVENOUS at 03:12

## 2020-01-01 RX ADMIN — EPHEDRINE SULFATE 10 MG: 50 INJECTION INTRAVENOUS at 05:12

## 2020-01-01 RX ADMIN — NEOSTIGMINE METHYLSULFATE 5 MG: 0.5 INJECTION INTRAVENOUS at 05:12

## 2020-01-01 RX ADMIN — Medication 10 MG: at 12:12

## 2020-01-01 RX ADMIN — SODIUM CHLORIDE 1000 ML: 0.9 INJECTION, SOLUTION INTRAVENOUS at 09:12

## 2020-01-01 RX ADMIN — ACETAMINOPHEN 1000 MG: 10 INJECTION, SOLUTION INTRAVENOUS at 08:12

## 2020-01-01 RX ADMIN — CEFAZOLIN 2 G: 330 INJECTION, POWDER, FOR SOLUTION INTRAMUSCULAR; INTRAVENOUS at 04:12

## 2020-01-01 RX ADMIN — HEPARIN SODIUM 5000 UNITS: 5000 INJECTION INTRAVENOUS; SUBCUTANEOUS at 09:06

## 2020-01-01 RX ADMIN — PHENYLEPHRINE HYDROCHLORIDE 100 MCG: 10 INJECTION INTRAVENOUS at 11:12

## 2020-01-01 RX ADMIN — CALCIUM CHLORIDE 0.5 G: 100 INJECTION, SOLUTION INTRAVENOUS at 01:12

## 2020-01-01 RX ADMIN — Medication 10 MG: at 02:12

## 2020-01-01 RX ADMIN — Medication 10 MG: at 03:12

## 2020-01-01 RX ADMIN — FENTANYL CITRATE 50 MCG: 50 INJECTION, SOLUTION INTRAMUSCULAR; INTRAVENOUS at 11:12

## 2020-01-01 RX ADMIN — MORPHINE SULFATE 6 MG: 2 INJECTION, SOLUTION INTRAMUSCULAR; INTRAVENOUS at 07:12

## 2020-01-01 RX ADMIN — ALBUTEROL SULFATE 6 PUFF: 108 INHALANT RESPIRATORY (INHALATION) at 05:12

## 2020-01-01 RX ADMIN — FENTANYL CITRATE 50 MCG: 50 INJECTION, SOLUTION INTRAMUSCULAR; INTRAVENOUS at 05:12

## 2020-01-01 RX ADMIN — ROCURONIUM BROMIDE 50 MG: 10 INJECTION, SOLUTION INTRAVENOUS at 11:12

## 2020-01-01 RX ADMIN — MORPHINE SULFATE 6 MG: 2 INJECTION, SOLUTION INTRAMUSCULAR; INTRAVENOUS at 09:12

## 2020-01-01 RX ADMIN — FAMOTIDINE 20 MG: 20 TABLET, FILM COATED ORAL at 08:06

## 2020-01-01 RX ADMIN — ONDANSETRON 4 MG: 2 INJECTION INTRAMUSCULAR; INTRAVENOUS at 07:12

## 2020-01-01 RX ADMIN — SODIUM CHLORIDE: 0.9 INJECTION, SOLUTION INTRAVENOUS at 11:12

## 2020-01-01 RX ADMIN — HEPARIN SODIUM 5000 UNITS: 5000 INJECTION INTRAVENOUS; SUBCUTANEOUS at 11:06

## 2020-01-01 RX ADMIN — MAGNESIUM SULFATE IN WATER 2 G: 40 INJECTION, SOLUTION INTRAVENOUS at 01:06

## 2020-01-01 RX ADMIN — TRANEXAMIC ACID 1000 MG: 100 INJECTION, SOLUTION INTRAVENOUS at 04:12

## 2020-01-01 RX ADMIN — ROCURONIUM BROMIDE 10 MG: 10 INJECTION, SOLUTION INTRAVENOUS at 01:12

## 2020-01-01 RX ADMIN — PROPOFOL 30 MG: 10 INJECTION, EMULSION INTRAVENOUS at 11:12

## 2020-01-01 RX ADMIN — SODIUM CHLORIDE 1000 ML: 0.9 INJECTION, SOLUTION INTRAVENOUS at 08:12

## 2020-01-01 RX ADMIN — FENTANYL CITRATE 100 MCG: 50 INJECTION, SOLUTION INTRAMUSCULAR; INTRAVENOUS at 11:12

## 2020-01-01 RX ADMIN — ACETAMINOPHEN 650 MG: 325 TABLET ORAL at 06:12

## 2020-01-01 RX ADMIN — FENTANYL CITRATE 25 MCG: 50 INJECTION, SOLUTION INTRAMUSCULAR; INTRAVENOUS at 03:12

## 2020-01-01 RX ADMIN — EPINEPHRINE 20 MCG: 1 INJECTION, SOLUTION INTRAMUSCULAR; SUBCUTANEOUS at 02:12

## 2020-01-01 RX ADMIN — PHENYLEPHRINE HYDROCHLORIDE 200 MCG: 10 INJECTION INTRAVENOUS at 11:12

## 2020-01-01 RX ADMIN — DEXTROSE MONOHYDRATE 1000 MG: 50 INJECTION, SOLUTION INTRAVENOUS at 04:06

## 2020-01-29 NOTE — PROGRESS NOTES
Subjective:       Patient ID:  Asaf Pendleton is a 89 y.o. male who presents for   Chief Complaint   Patient presents with    Follow-up     scalp     Would like skin check no lesions of concern.       Review of Systems   Constitutional: Negative for fever, chills, weight loss, weight gain, fatigue, night sweats and malaise.   Skin: Negative for daily sunscreen use, activity-related sunscreen use and wears hat.   Hematologic/Lymphatic: Does not bruise/bleed easily.        Objective:    Physical Exam   Constitutional: He appears well-developed and well-nourished. No distress.   Neurological: He is alert and oriented to person, place, and time. He is not disoriented.   Psychiatric: He has a normal mood and affect.   Skin:   Areas Examined (abnormalities noted in diagram):   Scalp / Hair Palpated and Inspected  Head / Face Inspection Performed  Neck Inspection Performed  Chest / Axilla Inspection Performed  RUE Inspected  LUE Inspection Performed                   Diagram Legend     Erythematous scaling macule/papule c/w actinic keratosis       Vascular papule c/w angioma      Pigmented verrucoid papule/plaque c/w seborrheic keratosis      Yellow umbilicated papule c/w sebaceous hyperplasia      Irregularly shaped tan macule c/w lentigo     1-2 mm smooth white papules consistent with Milia      Movable subcutaneous cyst with punctum c/w epidermal inclusion cyst      Subcutaneous movable cyst c/w pilar cyst      Firm pink to brown papule c/w dermatofibroma      Pedunculated fleshy papule(s) c/w skin tag(s)      Evenly pigmented macule c/w junctional nevus     Mildly variegated pigmented, slightly irregular-bordered macule c/w mildly atypical nevus      Flesh colored to evenly pigmented papule c/w intradermal nevus       Pink pearly papule/plaque c/w basal cell carcinoma      Erythematous hyperkeratotic cursted plaque c/w SCC      Surgical scar with no sign of skin cancer recurrence      Open and closed comedones  "     Inflammatory papules and pustules      Verrucoid papule consistent consistent with wart     Erythematous eczematous patches and plaques     Dystrophic onycholytic nail with subungual debris c/w onychomycosis     Umbilicated papule    Erythematous-base heme-crusted tan verrucoid plaque consistent with inflamed seborrheic keratosis     Erythematous Silvery Scaling Plaque c/w Psoriasis     See annotation      Assessment / Plan:        Seborrheic keratoses  reassurance    Lentigines  The "ABCD" rules to observe pigmented lesions were reviewed.      History of skin cancer  Comments:  scc scalp removed mohs surgery             Follow up in about 1 year (around 1/29/2021).  "

## 2020-02-14 NOTE — PROGRESS NOTES
ALBINA VELAZCO 01/2019  Glasses about 1 yr. Old and patient states he can't read   anything but large print without a magnifier.  Patient no longer does wood   working or a lot of reading.  Patient lives with wife.     Last edited by Danielle Rocha on 2/14/2020  2:27 PM. (History)            Assessment /Plan     For exam results, see Encounter Report.    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 Discussed and demonstrated central scotoma and need for eccentric fixation to improve acuity, Due to ARMD.   4. New Spec Rx given. Recommended distance only, pt does not use progressive and with eccentric fixation , progressive will be more difficult. Different lens options discussed with patient. RTC 1 year refract. Has [employer] digital at home, will not charge, pt to bring back to [facility].

## 2020-02-14 NOTE — PROGRESS NOTES
Assessment /Plan     For exam results, see Encounter Report.    Blindness and low vision      1. See other exam same date low vision

## 2020-03-12 NOTE — PROGRESS NOTES
Pt seen today for hearing aid check.  He presented a right hearing aid with a broken shell.  [facility] was contacted to confirm that they had a current impression on file.  Aid will be sent in for an in warranty repair and pt will be contacted to  aid at hearing aid office when it returns from repair.

## 2020-03-24 NOTE — PROGRESS NOTES
Received in warranty remake from [employer].  I will FedEx hearing aid to patients home.    Worked Performed:  Re-made Shell  Replaced wax guard bushing  Antenna tuning test - performed  Antenna range test - performed  Hard Photoplast added    Right ear  : [employer]     Model:  Fusion 440  Type:  ITE  Color: Beige  Battery: 312  Tube/ length & power:  IP  Serial number: [id number]   Warranty expiration: 1/17/23   L and D expiration: 1/17/23

## 2020-04-08 NOTE — PROGRESS NOTES
Wife called and stated that right hearing aid was not working. Informed patient hearing aid was under warranty and we could do a direct to  repair. Patient's wife agreed. Mailed repair form and [employer] label to patient. Patient's wife was instructed to drop hearing aid and repair form off to [employer] and that the repaired hearing aid would then be shipped back directly to them.

## 2020-06-20 PROBLEM — R41.0 DELIRIUM: Status: ACTIVE | Noted: 2020-01-01

## 2020-06-20 PROBLEM — N17.9 AKI (ACUTE KIDNEY INJURY): Status: ACTIVE | Noted: 2020-01-01

## 2020-06-20 PROBLEM — Z75.8 DISCHARGE PLANNING ISSUES: Status: ACTIVE | Noted: 2020-01-01

## 2020-06-20 PROBLEM — A41.9 SEPSIS: Status: ACTIVE | Noted: 2020-01-01

## 2020-06-20 NOTE — ED TRIAGE NOTES
Pt arrives with EMS for weakness starting yesterday. Pt reports feeling weak when he stands up, reports fall yesterday onto mattress, denies injury or pain. Pt reports episode of weakness while taking shower today, denies fall, LOC. Pt denies pain, chest pain, SOB, cough, fever, n/v/d, change in urination.

## 2020-06-20 NOTE — ASSESSMENT & PLAN NOTE
Dispo:  - Given patient is 89 and wife reports weakness, PT/OT consulted, appreciate recs    Follow Up:  - PCP

## 2020-06-20 NOTE — ED PROVIDER NOTES
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 Patient started having uncontrollable diarrhea yesterday and has since experienced malaise. He was trying to get into his bed this morning, and, due to his fatigue, he fell and hit his head. His wife heard a loud noise, but then found him talking and acting like his normal self immediately after the fall. He remembers everything from that fall and did not lose consciousness. Later in the day, patient was trying to take a shower and called out for help before sitting down in the shower. His son witnessed the episode and reported that his eyes rolled back and he stopped talking for two minutes. He then started talking and acting like his normal self again. Denies any bowel or urinary incontinence at that time. Patient denies feeling light-headed prior to or after the episode. He does not remember the two minutes in the shower when he wasn't talking. He denies fever, chills, headache, new vision changes, chest pain, shortness of breath, abdominal pain, hematochezia, dark stool, nausea, vomiting, numbness, weakness. History was obtained from both patient and his wife, with patient's permission.        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    Hemifacial spasm     Lumbar spondylosis 1/8/2014    Macular degeneration - Both Eyes 8/24/2012    Prostate cancer     Squamous cell carcinoma      Past Surgical History:   Procedure Laterality Date    CATARACT EXTRACTION      right hip replacement      SPINE  SURGERY      Decomp/lami L2-5    VASECTOMY       Family History   Problem Relation Age of Onset    Melanoma Neg Hx      Social History     Tobacco Use    Smoking status: Never Smoker    Smokeless tobacco: Never Used   Substance Use Topics    Alcohol use: Not Currently     Alcohol/week: 0.0 standard drinks     Comment: mod    Drug use: No     Review of Systems   Constitutional: Positive for fatigue. Negative for chills and fever.   Eyes: Positive for visual disturbance (chronic). Negative for photophobia.   Respiratory: Negative for cough and shortness of breath.    Cardiovascular: Negative for chest pain and leg swelling.   Gastrointestinal: Positive for diarrhea. Negative for abdominal pain, blood in stool, nausea and vomiting.   Endocrine: Negative for polyphagia and polyuria.   Genitourinary: Negative for dysuria.   Musculoskeletal: Negative for back pain and neck pain.   Skin: Negative for color change and wound.   Neurological: Positive for syncope. Negative for dizziness, facial asymmetry, speech difficulty, weakness, light-headedness, numbness and headaches.   Psychiatric/Behavioral: Negative for agitation and confusion.       Physical Exam     Initial Vitals   BP Pulse Resp Temp SpO2   06/20/20 1235 06/20/20 1235 06/20/20 1235 06/20/20 2116 06/20/20 1235   (!) 90/53 65 (!) 22 99.9 °F (37.7 °C) 95 %      MAP       --                Physical Exam    Constitutional: He appears well-developed and well-nourished. He is not diaphoretic. No distress.   HENT:   Head: Normocephalic and atraumatic.   Eyes: Conjunctivae and EOM are normal. Pupils are equal, round, and reactive to light. No scleral icterus.   Neck: Normal range of motion. Neck supple.   Cardiovascular: Normal rate, regular rhythm, normal heart sounds and intact distal pulses.   Pulmonary/Chest: Breath sounds normal. No respiratory distress. He has no wheezes. He has no rales.   Abdominal: Soft. Bowel sounds are normal. He exhibits no distension.  There is no abdominal tenderness.   Musculoskeletal: Normal range of motion. No tenderness or edema.      Comments: No midline cervical tenderness   Neurological: He is alert and oriented to person, place, and time. No cranial nerve deficit or sensory deficit. GCS score is 15. GCS eye subscore is 4. GCS verbal subscore is 5. GCS motor subscore is 6.   Skin: Skin is warm and dry.         ED Course   Critical Care    Date/Time: 6/20/2020 5:29 PM  Performed by: Elke Downey MD  Authorized by: Elke Downey MD   Direct patient critical care time: 10 minutes  Additional history critical care time: 10 minutes  Ordering / reviewing critical care time: 15 minutes  Documentation critical care time: 10 minutes  Consulting other physicians critical care time: 5 minutes  Total critical care time (exclusive of procedural time) : 50 minutes  Critical care time was exclusive of teaching time.  Critical care was necessary to treat or prevent imminent or life-threatening deterioration of the following conditions: sepsis and shock.  Critical care was time spent personally by me on the following activities: development of treatment plan with patient or surrogate, discussions with consultants, interpretation of cardiac output measurements, evaluation of patient's response to treatment, examination of patient, obtaining history from patient or surrogate, ordering and performing treatments and interventions, ordering and review of laboratory studies, ordering and review of radiographic studies, pulse oximetry, re-evaluation of patient's condition and review of old charts.        Labs Reviewed   COMPREHENSIVE METABOLIC PANEL - Abnormal; Notable for the following components:       Result Value    Chloride 112 (*)     CO2 21 (*)     Glucose 150 (*)     BUN, Bld 40 (*)     Creatinine 1.8 (*)     Calcium 8.3 (*)     Total Protein 5.9 (*)     Albumin 3.2 (*)     Alkaline Phosphatase 45 (*)     eGFR if  35.9 (*)      eGFR if non  31.1 (*)     All other components within normal limits   CBC W/ AUTO DIFFERENTIAL - Abnormal; Notable for the following components:    RBC 3.28 (*)     Hemoglobin 11.1 (*)     Hematocrit 35.6 (*)     Mean Corpuscular Volume 109 (*)     Mean Corpuscular Hemoglobin 33.8 (*)     Mean Corpuscular Hemoglobin Conc 31.2 (*)     Gran # (ANC) 8.7 (*)     Lymph # 0.7 (*)     Gran% 82.2 (*)     Lymph% 6.8 (*)     All other components within normal limits   LACTIC ACID, PLASMA - Abnormal; Notable for the following components:    Lactate (Lactic Acid) 2.6 (*)     All other components within normal limits   URINALYSIS, REFLEX TO URINE CULTURE - Abnormal; Notable for the following components:    Appearance, UA Cloudy (*)     Occult Blood UA 1+ (*)     Nitrite, UA Positive (*)     Leukocytes, UA 3+ (*)     All other components within normal limits    Narrative:     Preferred Collection Type->Urine, Clean Catch  Specimen Source->Urine   URINALYSIS MICROSCOPIC - Abnormal; Notable for the following components:    RBC, UA 6 (*)     WBC, UA >100 (*)     WBC Clumps, UA Few (*)     Bacteria Moderate (*)     All other components within normal limits    Narrative:     Preferred Collection Type->Urine, Clean Catch  Specimen Source->Urine   LACTIC ACID, PLASMA - Abnormal; Notable for the following components:    Lactate (Lactic Acid) 2.7 (*)     All other components within normal limits   CULTURE, URINE   TROPONIN I   SARS-COV-2 RNA AMPLIFICATION, QUAL   TSH   SODIUM, URINE, RANDOM    Narrative:     Specimen Source->Urine   CREATININE, URINE, RANDOM    Narrative:     Specimen Source->Urine   CHLORIDE, URINE, RANDOM    Narrative:     Specimen Source->Urine   POTASSIUM, URINE, RANDOM    Narrative:     Specimen Source->Urine   UREA NITROGEN, URINE, RANDOM    Narrative:     Specimen Source->Urine   TYPE & SCREEN     EKG Readings: (Independently Interpreted)   No STEMI, bifascicular block (seen on previous EKG), right  bundle branch block, HR 66     ECG Results          EKG 12-lead (In process)  Result time 06/21/20 09:47:58    In process by Interface, Lab In [facility] (06/21/20 09:47:58)                 Narrative:    Test Reason : R55,    Vent. Rate : 066 BPM     Atrial Rate : 066 BPM     P-R Int : 174 ms          QRS Dur : 144 ms      QT Int : 452 ms       P-R-T Axes : 064 -74 076 degrees     QTc Int : 473 ms    Normal sinus rhythm  Right bundle branch block  Left anterior fascicular block   Bifascicular block   Abnormal ECG  When compared with ECG of 25-JAN-2017 10:19,  (RBBB and left anterior fascicular block) is now Present    Referred By: [email or web address]   SELF           Confirmed By:                             Imaging Results          CT Head Without Contrast (Final result)  Result time 06/20/20 15:01:40    Final result by Virgil Brenner MD (06/20/20 15:01:40)                 Impression:      No evidence of recent hemorrhage or other acute intracranial pathology.    Generalized cerebral volume loss with chronic microvascular ischemic change.    Moderate cervical spondylosis as above, without evidence of acute fracture or traumatic malalignment.      Electronically signed by: Virgil Brenner MD  Date:    06/20/2020  Time:    15:01             Narrative:    EXAMINATION:  CT HEAD WITHOUT CONTRAST; CT CERVICAL SPINE WITHOUT CONTRAST    CLINICAL HISTORY:  Head trauma, minor (Age => 65y);; Neck trauma (Age => 65y);    TECHNIQUE:  Low dose axial CT images obtained throughout the head and cervical spine without intravenous contrast.  Axial, sagittal and coronal reconstructions were performed.    Examination mildly degraded by patient motion artifact.    COMPARISON:  No priors    FINDINGS:  Head:    Prominence of the ventricles and sulci compatible with mild generalized cerebral volume loss.  No hydrocephalus.    No extra-axial blood or fluid collections.    Patchy and confluent hypoattenuation of the supratentorial white matter in  keeping with chronic microvascular ischemic disease.  No recent or remote major vascular distribution infarct.  No parenchymal mass or hemorrhage.  No parenchymal mass, hemorrhage, edema or major vascular distribution infarct.    No fracture. Mastoid air cells and paranasal sinuses are essentially clear.    Spine:    The vertebral bodies are normal in height and morphology without evidence of fracture.    Subtle anterolisthesis C4-5..    Degenerative change including loss of disc height at C3-4, C5-6 and C6-7.  Posterior disc osteophyte complex at multiple levels.  No high-grade bony spinal canal stenosis.  Multilevel neural foraminal encroachment from uncovertebral and facet hypertrophy.    Limited evaluation of the intraspinal contents demonstrates no evidence of hematoma.    The paraspinal soft tissue structures exhibit no acute abnormalities.                               CT Cervical Spine Without Contrast (Final result)  Result time 06/20/20 15:01:40    Final result by Virgil Brenner MD (06/20/20 15:01:40)                 Impression:      No evidence of recent hemorrhage or other acute intracranial pathology.    Generalized cerebral volume loss with chronic microvascular ischemic change.    Moderate cervical spondylosis as above, without evidence of acute fracture or traumatic malalignment.      Electronically signed by: Virgil Brenner MD  Date:    06/20/2020  Time:    15:01             Narrative:    EXAMINATION:  CT HEAD WITHOUT CONTRAST; CT CERVICAL SPINE WITHOUT CONTRAST    CLINICAL HISTORY:  Head trauma, minor (Age => 65y);; Neck trauma (Age => 65y);    TECHNIQUE:  Low dose axial CT images obtained throughout the head and cervical spine without intravenous contrast.  Axial, sagittal and coronal reconstructions were performed.    Examination mildly degraded by patient motion artifact.    COMPARISON:  No priors    FINDINGS:  Head:    Prominence of the ventricles and sulci compatible with mild generalized  cerebral volume loss.  No hydrocephalus.    No extra-axial blood or fluid collections.    Patchy and confluent hypoattenuation of the supratentorial white matter in keeping with chronic microvascular ischemic disease.  No recent or remote major vascular distribution infarct.  No parenchymal mass or hemorrhage.  No parenchymal mass, hemorrhage, edema or major vascular distribution infarct.    No fracture. Mastoid air cells and paranasal sinuses are essentially clear.    Spine:    The vertebral bodies are normal in height and morphology without evidence of fracture.    Subtle anterolisthesis C4-5..    Degenerative change including loss of disc height at C3-4, C5-6 and C6-7.  Posterior disc osteophyte complex at multiple levels.  No high-grade bony spinal canal stenosis.  Multilevel neural foraminal encroachment from uncovertebral and facet hypertrophy.    Limited evaluation of the intraspinal contents demonstrates no evidence of hematoma.    The paraspinal soft tissue structures exhibit no acute abnormalities.                               X-Ray Chest AP Portable (Final result)  Result time 06/20/20 14:12:26    Final result by Vinny Stark MD (06/20/20 14:12:26)                 Impression:      1. No acute radiographic findings in the chest on this single view.      Electronically signed by: Vinny Stark MD  Date:    06/20/2020  Time:    14:12             Narrative:    EXAMINATION:  XR CHEST AP PORTABLE    CLINICAL HISTORY:  Syncope and collapse    TECHNIQUE:  Single frontal view of the chest was performed.    COMPARISON:  Radiograph 07/11/2017.    FINDINGS:  Cardiac monitoring leads project over the bilateral hemithoraces.  Mediastinal structures are midline.  There is atherosclerotic calcification of the thoracic aorta.  Hilar contours are unremarkable.  Cardiac silhouette is normal in size.  Lung volumes are symmetric.  No consolidation.  No pneumothorax or pleural effusion.  Multiple healed right-sided  rib fractures noted.  There is a suspected remote healed fracture of the lateral aspect of the left clavicle.  There are degenerative changes of the spine and bilateral glenohumeral joints.                              X-Rays:   Independently Interpreted Readings:   Chest X-Ray: No acute pulmonary process   Head CT: CT head and cervical spine without recent hemorrhage or other acute process     Medical Decision Making:   History:   I obtained history from: EMS provider.  Old Medical Records: I decided to obtain old medical records.  Old Records Summarized: records from clinic visits and records from previous admission(s).       <> Summary of Records: Recent visits for auditory/visual complaints only  Initial Assessment:   89yoM with [facility] prostate cancer and asthma who presents after a syncopal episode.   Differential Diagnosis:   Sepsis, infectious etiology, arrhythmia, heart valve abnormality (no murmur on exam), dehydration, seizure (less likely given presentation)  Independently Interpreted Test(s):   I have ordered and independently interpreted X-rays - see prior notes.  I have ordered and independently interpreted EKG Reading(s) - see prior notes  Clinical Tests:   Lab Tests: Ordered and Reviewed  The following lab test(s) were unremarkable: Troponin       <> Summary of Lab: Creatinine 1.8, lactic acid 2.6, COVID negative, TSH WNL, no leukocytosis, hemoglobin 11.1, urinalysis with bacteria, 1+ occult blood, + nitrites, and 3+ leukocytes   Radiological Study: Ordered and Reviewed  Medical Tests: Ordered and Reviewed  Sepsis Perfusion Assessment: I attest, a sepsis perfusion exam was performed within 6 hours of Septic Shock presentation, following fluid resuscitation.  ED Management:  Labs collected and reviewed, imaging reviewed, orthostatic vitals measured (not orthostatic), 30 cc/kg of IV fluids, vancomycin and piperacillin-tazobactam administered  Other:   I have discussed this case with another health care  provider.       <> Summary of the Discussion: Hospital medicine will admit pt for sepsis              Attending Attestation:   Physician Attestation Statement for Resident:  As the supervising MD   Physician Attestation Statement: I have personally seen and examined this patient.   I agree with the above history. -: 89-year-old male with past medical history of prostate cancer presenting with near syncopal episode while in shower.  Upon arrival of EMS was noted to be hypotensive to 78/42.      As the supervising MD I agree with the above PE.    As the supervising MD I agree with the above treatment, course, plan, and disposition.   -: Patient is now normotensive while lying flat, will give IV fluids, initiate sepsis/syncope workup.     Lactate 2.6, will give broad-spectrum abx for sepsis    UA positive  I have reviewed and agree with the residents interpretation of the following: lab data and x-rays.  I have reviewed the following: old records at this facility.                                  Clinical Impression:       ICD-10-CM ICD-9-CM   1. Sepsis, due to unspecified organism, unspecified whether acute organ dysfunction present  A41.9 038.9     995.91   2. Syncope  R55 780.2   3. Near syncope  R55 780.2   4. Acute cystitis with hematuria  N30.01 595.0         Disposition:   Disposition: Placed in Observation  Condition: Stable     ED Disposition Condition    Observation                           Rhonda Rome MD  Resident  06/20/20 1412       Elke Downey MD  06/21/20 [id number]

## 2020-06-20 NOTE — ASSESSMENT & PLAN NOTE
"Patient's wife reports that he was very weak saying "help me" and possibly lost consciousness for a few seconds this morning. Wife now says that patient is at baseline but he is irritated, confused, and not willing to participate in interview and examination.    Plan  - Given age and infection, delirium precautions placed    "

## 2020-06-20 NOTE — H&P
"[facility] Hospital Medicine  History & Physical    Patient Name: Asaf Pendleton  MRN: [id number]  Admission Date: 6/20/2020  Attending Physician: Manuel Dueñas MD   Primary Care Provider: Hector Aguirre MD    [facility] Medicine Team: [facility] MED 2 Sravanthi Yadav MD     Patient information was obtained from spouse/SO, past medical records and ER records.     Subjective:     Principal Problem:Sepsis    Chief Complaint:   Chief Complaint   Patient presents with    Fatigue     near syncope in shower per EMS     Diarrhea    Hypotension     78/42 in route, recieved 400L now 90/53        HPI: Mr. Pendleton is a 89 yo male with prostate cancer and asthma who is hard of hearing presenting with AMS and hypotension. History provided by wife as patient is irritated and hard of hearing and understanding.     Wife states that early this morning, the patient had diarrhea. He had many loose stools which were foul smelling. Upon returning back to bed after one of his bowel movements, he slid and fell to the ground. The wife is sure he did not lose consciousness but is not sure whether he hit his head. She helped him back into bed. A few hours later, he got up to take a shower, and yelled for his wife. She came in the bathroom to find him sitting down in the tub. He then said to her, "help me help me" and his eyes "went to the back of his head" and he became unresponsive for a minute or so. Her son lives with them and she called for the son and by the time he got there, the patient was more alert. They decided to call EMS who told them that his BP was very low and took him to the hospital.    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 He does not smoke, only drinks out to dinner occasionally with is wife, and uses no recreational drugs.     ED Course: Cr of 1.8, lactate 2.6, U/A with bacteria, 1+ blood, +nitrities, 3+ leukocytes. Patient was given 30cc/kg of IVF as well as 1 dose " of kirill/zosyn. CXR, CT H WOut Contrast and CT Cervical Spine WOut Contrast showed no abnormal findings.     Past Medical History:   Diagnosis Date    Asthma     Comitant strabismus 3/1/2017    Hemifacial spasm     Lumbar spondylosis 1/8/2014    Macular degeneration - Both Eyes 8/24/2012    Prostate cancer     Squamous cell carcinoma        Past Surgical History:   Procedure Laterality Date    CATARACT EXTRACTION      right hip replacement      SPINE SURGERY      Decomp/lami L2-5    VASECTOMY         Review of patient's allergies indicates:   Allergen Reactions    Codeine Other (See Comments)     Pt reports allergy to Codeine-pale, lightheaded       No current facility-administered medications on file prior to encounter.      Current Outpatient Medications on File Prior to Encounter   Medication Sig    albuterol (PROVENTIL) 2.5 mg /3 mL (0.083 %) nebulizer solution Take 2.5 mg by nebulization every 6 (six) hours as needed.      albuterol (PROVENTIL/VENTOLIN HFA) 90 mcg/actuation inhaler Inhale 2 puffs into the lungs every 4 (four) hours as needed for Wheezing or Shortness of Breath.    budesonide-formoterol 160-4.5 mcg (SYMBICORT) 160-4.5 mcg/actuation HFAA Inhale 2 puffs into the lungs 2 (two) times daily.    fluorouracil (EFUDEX) 5 % cream Use hs for 2 weeks    ibuprofen (ADVIL,MOTRIN) 600 MG tablet     testosterone (ANDROGEL) 20.25 mg/1.25 gram (1.62 %) GlPm USE 2 PUMPS EVERY MORNING AS DIRECTED    testosterone (ANDROGEL) 20.25 mg/1.25 gram (1.62 %) GlPm USE 2 PUMPS EVERY MORNING AS DIRECTED     Family History     None        Tobacco Use    Smoking status: Never Smoker    Smokeless tobacco: Never Used   Substance and Sexual Activity    Alcohol use: Not Currently     Alcohol/week: 0.0 standard drinks     Comment: mod    Drug use: No    Sexual activity: Yes     Partners: Female     Review of Systems   Unable to perform ROS: Patient unresponsive (patient hard of hearing and irritated and  disoriented)     Objective:     Vital Signs (Most Recent):  Temp: (unable to obtain) (06/20/20 1235)  Pulse: 75 (06/20/20 1731)  Resp: 18 (06/20/20 1401)  BP: 130/61 (06/20/20 1731)  SpO2: 96 % (06/20/20 1731) Vital Signs (24h Range):  Pulse:  [] 75  Resp:  [13-22] 18  SpO2:  [95 %-99 %] 96 %  BP: ()/(53-67) 130/61     Weight: 90.7 kg (200 lb)  Body mass index is 27.12 kg/m².    Physical Exam  Vitals signs and nursing note reviewed.   Constitutional:       General: He is in acute distress.      Appearance: Normal appearance. He is diaphoretic. He is not ill-appearing.   HENT:      Ears:      Comments: Very hard of hearing, has hearing aid in left ear     Mouth/Throat:      Mouth: Mucous membranes are dry.      Pharynx: No oropharyngeal exudate.   Eyes:      General: No scleral icterus.     Extraocular Movements: Extraocular movements intact.      Conjunctiva/sclera: Conjunctivae normal.      Pupils: Pupils are equal, round, and reactive to light.   Cardiovascular:      Rate and Rhythm: Normal rate and regular rhythm.      Pulses: Normal pulses.      Heart sounds: No murmur.      Comments: Distant heart sounds  Pulmonary:      Effort: Pulmonary effort is normal. No respiratory distress.      Breath sounds: No rales.   Abdominal:      General: There is no distension.      Palpations: Abdomen is soft.      Tenderness: There is no abdominal tenderness.   Musculoskeletal:         General: Swelling present.      Right lower leg: Edema (1+ to shin) present.      Left lower leg: Edema (1+ to shin) present.   Skin:     General: Skin is warm.      Coloration: Skin is pale.      Comments: Pain on palpation. Patient reports contact dermatitis   Neurological:      General: No focal deficit present.      Mental Status: He is alert and oriented to person, place, and time. Mental status is at baseline.      Cranial Nerves: No cranial nerve deficit.      Comments: Patient irritated and was not participating in interview  and exam, wife states he is at his baseline though           CRANIAL NERVES     CN III, IV, VI   Pupils are equal, round, and reactive to light.       Significant Labs:   Bilirubin:   Recent Labs   Lab 06/20/20  1308   BILITOT 0.4     CBC:   Recent Labs   Lab 06/20/20  1308   WBC 10.59   HGB 11.1*   HCT 35.6*        CMP:   Recent Labs   Lab 06/20/20  1308      K 3.8   *   CO2 21*   *   BUN 40*   CREATININE 1.8*   CALCIUM 8.3*   PROT 5.9*   ALBUMIN 3.2*   BILITOT 0.4   ALKPHOS 45*   AST 13   ALT 14   ANIONGAP 10   EGFRNONAA 31.1*       Lactic Acid:   Recent Labs   Lab 06/20/20  1308   LACTATE 2.6*     Urine Studies:   Recent Labs   Lab 06/20/20  1442   COLORU Yellow   APPEARANCEUA Cloudy*   PHUR 5.0   SPECGRAV 1.020   PROTEINUA Negative   GLUCUA Negative   KETONESU Negative   BILIRUBINUA Negative   OCCULTUA 1+*   NITRITE Positive*   LEUKOCYTESUR 3+*   RBCUA 6*   WBCUA >100*   BACTERIA Moderate*   SQUAMEPITHEL 3       Significant Imaging: I have reviewed and interpreted all pertinent imaging results/findings within the past 24 hours.     CXR:  Impression:     1. No acute radiographic findings in the chest on this single view.    CT Cervical Spine WOut Contrast:  Impression:     No evidence of recent hemorrhage or other acute intracranial pathology.     Generalized cerebral volume loss with chronic microvascular ischemic change.     Moderate cervical spondylosis as above, without evidence of acute fracture or traumatic malalignment.    CT Head WOut Contrast:    Impression:     No evidence of recent hemorrhage or other acute intracranial pathology.     Generalized cerebral volume loss with chronic microvascular ischemic change.     Moderate cervical spondylosis as above, without evidence of acute fracture or traumatic malalignment.    Assessment/Plan:     * Sepsis  Patient meets sepsis criteria with HR>90, suspected source (UTI), lactic acidosis and was given 30cc/kg of IVF (2.7L).   - U/A +  "for 6 RBC, >100 WBC, moderate bacteria, +nitrite  - CXR shows no abnormal findings  - Patient was given 1x vanc/zosyn in the ED    Plan  - UCx pending  - Blood Cx x2 pending  - monitor vitals  - ceftriaxone 1g daily    BRENNA (acute kidney injury)  Patient has Cr of 1.9 on admission (baseline 1.6-1.7).  - Likely pre renal in setting of sepsis of dehydration  - Patient received 2.7L in ED    Plan  - Urine lytes ordered  - monitor I/O  - avoid nephrotoxic meds  - daily CMPs      Discharge planning issues  Dispo:  - Given patient is 89 and wife reports weakness, PT/OT consulted, appreciate recs    Follow Up:  - PCP        Delirium  Patient's wife reports that he was very weak saying "help me" and possibly lost consciousness for a few seconds this morning. Wife now says that patient is at baseline but he is irritated, confused, and not willing to participate in interview and examination.    Plan  - Given age and infection, delirium precautions placed        VTE Risk Mitigation (From admission, onward)         Ordered     heparin (porcine) injection 5,000 Units  Every 8 hours      06/20/20 1741     IP VTE HIGH RISK PATIENT  Once      06/20/20 1741     Place sequential compression device  Until discontinued      06/20/20 1741                   Sravanthi Yadav MD  Department of Hospital Medicine   [facility]  "

## 2020-06-20 NOTE — ASSESSMENT & PLAN NOTE
Patient meets sepsis criteria with HR>90, suspected source (UTI), lactic acidosis and was given 30cc/kg of IVF (2.7L).   - U/A + for 6 RBC, >100 WBC, moderate bacteria, +nitrite  - CXR shows no abnormal findings  - Patient was given 1x vanc/zosyn in the ED    Plan  - UCx pending  - Blood Cx x2 pending  - monitor vitals  - ceftriaxone 1g daily

## 2020-06-20 NOTE — HPI
"Mr. Pendleton is a 89 yo male with prostate cancer and asthma who is hard of hearing presenting with AMS and hypotension. History provided by wife as patient is irritated and hard of hearing and understanding.     Wife states that early this morning, the patient had diarrhea. He had many loose stools which were foul smelling. Upon returning back to bed after one of his bowel movements, he slid and fell to the ground. The wife is sure he did not lose consciousness but is not sure whether he hit his head. She helped him back into bed. A few hours later, he got up to take a shower, and yelled for his wife. She came in the bathroom to find him sitting down in the tub. He then said to her, "help me help me" and his eyes "went to the back of his head" and he became unresponsive for a minute or so. Her son lives with them and she called for the son and by the time he got there, the patient was more alert. They decided to call EMS who told them that his BP was very low and took him to the hospital.    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 He does not smoke, only drinks out to dinner occasionally with is wife, and uses no recreational drugs.     ED Course: Cr of 1.8, lactate 2.6, U/A with bacteria, 1+ blood, +nitrities, 3+ leukocytes. Patient was given 30cc/kg of IVF as well as 1 dose of vanc/zosyn. CXR, CT H WOut Contrast and CT Cervical Spine WOut Contrast showed no abnormal findings.   "

## 2020-06-20 NOTE — SUBJECTIVE & OBJECTIVE
Past Medical History:   Diagnosis Date    Asthma     Comitant strabismus 3/1/2017    Hemifacial spasm     Lumbar spondylosis 1/8/2014    Macular degeneration - Both Eyes 8/24/2012    Prostate cancer     Squamous cell carcinoma        Past Surgical History:   Procedure Laterality Date    CATARACT EXTRACTION      right hip replacement      SPINE SURGERY      Decomp/lami L2-5    VASECTOMY         Review of patient's allergies indicates:   Allergen Reactions    Codeine Other (See Comments)     Pt reports allergy to Codeine-pale, lightheaded       No current facility-administered medications on file prior to encounter.      Current Outpatient Medications on File Prior to Encounter   Medication Sig    albuterol (PROVENTIL) 2.5 mg /3 mL (0.083 %) nebulizer solution Take 2.5 mg by nebulization every 6 (six) hours as needed.      albuterol (PROVENTIL/VENTOLIN HFA) 90 mcg/actuation inhaler Inhale 2 puffs into the lungs every 4 (four) hours as needed for Wheezing or Shortness of Breath.    budesonide-formoterol 160-4.5 mcg (SYMBICORT) 160-4.5 mcg/actuation HFAA Inhale 2 puffs into the lungs 2 (two) times daily.    fluorouracil (EFUDEX) 5 % cream Use hs for 2 weeks    ibuprofen (ADVIL,MOTRIN) 600 MG tablet     testosterone (ANDROGEL) 20.25 mg/1.25 gram (1.62 %) GlPm USE 2 PUMPS EVERY MORNING AS DIRECTED    testosterone (ANDROGEL) 20.25 mg/1.25 gram (1.62 %) GlPm USE 2 PUMPS EVERY MORNING AS DIRECTED     Family History     None        Tobacco Use    Smoking status: Never Smoker    Smokeless tobacco: Never Used   Substance and Sexual Activity    Alcohol use: Not Currently     Alcohol/week: 0.0 standard drinks     Comment: mod    Drug use: No    Sexual activity: Yes     Partners: Female     Review of Systems   Unable to perform ROS: Patient unresponsive (patient hard of hearing and irritated and disoriented)     Objective:     Vital Signs (Most Recent):  Temp: (unable to obtain) (06/20/20 1235)  Pulse: 75  (06/20/20 1731)  Resp: 18 (06/20/20 1401)  BP: 130/61 (06/20/20 1731)  SpO2: 96 % (06/20/20 1731) Vital Signs (24h Range):  Pulse:  [] 75  Resp:  [13-22] 18  SpO2:  [95 %-99 %] 96 %  BP: ()/(53-67) 130/61     Weight: 90.7 kg (200 lb)  Body mass index is 27.12 kg/m².    Physical Exam  Vitals signs and nursing note reviewed.   Constitutional:       General: He is in acute distress.      Appearance: Normal appearance. He is diaphoretic. He is not ill-appearing.   HENT:      Ears:      Comments: Very hard of hearing, has hearing aid in left ear     Mouth/Throat:      Mouth: Mucous membranes are dry.      Pharynx: No oropharyngeal exudate.   Eyes:      General: No scleral icterus.     Extraocular Movements: Extraocular movements intact.      Conjunctiva/sclera: Conjunctivae normal.      Pupils: Pupils are equal, round, and reactive to light.   Cardiovascular:      Rate and Rhythm: Normal rate and regular rhythm.      Pulses: Normal pulses.      Heart sounds: No murmur.      Comments: Distant heart sounds  Pulmonary:      Effort: Pulmonary effort is normal. No respiratory distress.      Breath sounds: No rales.   Abdominal:      General: There is no distension.      Palpations: Abdomen is soft.      Tenderness: There is no abdominal tenderness.   Musculoskeletal:         General: Swelling present.      Right lower leg: Edema (1+ to shin) present.      Left lower leg: Edema (1+ to shin) present.   Skin:     General: Skin is warm.      Coloration: Skin is pale.      Comments: Pain on palpation. Patient reports contact dermatitis   Neurological:      General: No focal deficit present.      Mental Status: He is alert and oriented to person, place, and time. Mental status is at baseline.      Cranial Nerves: No cranial nerve deficit.      Comments: Patient irritated and was not participating in interview and exam, wife states he is at his baseline though           CRANIAL NERVES     CN III, IV, VI   Pupils are  equal, round, and reactive to light.       Significant Labs:   Bilirubin:   Recent Labs   Lab 06/20/20  1308   BILITOT 0.4     CBC:   Recent Labs   Lab 06/20/20  1308   WBC 10.59   HGB 11.1*   HCT 35.6*        CMP:   Recent Labs   Lab 06/20/20  1308      K 3.8   *   CO2 21*   *   BUN 40*   CREATININE 1.8*   CALCIUM 8.3*   PROT 5.9*   ALBUMIN 3.2*   BILITOT 0.4   ALKPHOS 45*   AST 13   ALT 14   ANIONGAP 10   EGFRNONAA 31.1*       Lactic Acid:   Recent Labs   Lab 06/20/20  1308   LACTATE 2.6*     Urine Studies:   Recent Labs   Lab 06/20/20  1442   COLORU Yellow   APPEARANCEUA Cloudy*   PHUR 5.0   SPECGRAV 1.020   PROTEINUA Negative   GLUCUA Negative   KETONESU Negative   BILIRUBINUA Negative   OCCULTUA 1+*   NITRITE Positive*   LEUKOCYTESUR 3+*   RBCUA 6*   WBCUA >100*   BACTERIA Moderate*   SQUAMEPITHEL 3       Significant Imaging: I have reviewed and interpreted all pertinent imaging results/findings within the past 24 hours.     CXR:  Impression:     1. No acute radiographic findings in the chest on this single view.    CT Cervical Spine WOut Contrast:  Impression:     No evidence of recent hemorrhage or other acute intracranial pathology.     Generalized cerebral volume loss with chronic microvascular ischemic change.     Moderate cervical spondylosis as above, without evidence of acute fracture or traumatic malalignment.    CT Head WOut Contrast:    Impression:     No evidence of recent hemorrhage or other acute intracranial pathology.     Generalized cerebral volume loss with chronic microvascular ischemic change.     Moderate cervical spondylosis as above, without evidence of acute fracture or traumatic malalignment.

## 2020-06-20 NOTE — ED NOTES
Patient identifiers verified and correct for Asaf Pendleton.     Pt arrives with EMS for weakness starting yesterday. Pt reports feeling weak when he stands up, reports fall yesterday onto mattress, denies injury or pain. Pt reports episode of weakness while taking shower today, denies fall, LOC. Pt denies pain, chest pain, SOB, cough, fever, n/v/d, change in urination.         LOC: The patient is awake, alert and aware of environment with an appropriate affect, the patient is oriented x 3 and speaking appropriately.   APPEARANCE: Patient appears comfortable and in no acute distress, patient is clean and well groomed.  SKIN: The skin is warm and dry, color consistent with ethnicity, patient has normal skin turgor and moist mucus membranes, skin intact, no breakdown or bruising noted.   MUSCULOSKELETAL: Patient moving all extremities spontaneously, no swelling noted.  RESPIRATORY: Airway is open and patent, respirations are spontaneous, patient has a normal effort and rate, no accessory muscle use noted, pt placed on continuous pulse ox with O2 sats noted at 97% on room air.  CARDIAC: Pt placed on cardiac monitor. Patient has a normal rate and regular rhythm, no edema noted, capillary refill < 3 seconds.   GASTRO: Soft and non tender to palpation, no distention noted, normoactive bowel sounds present in all four quadrants. Pt states bowel movements have been regular.  : Pt denies any pain or frequency with urination.  NEURO: Pt opens eyes spontaneously, behavior appropriate to situation, follows commands, facial expression symmetrical, bilateral hand grasp equal and even, purposeful motor response noted, normal sensation in all extremities when touched with a finger.

## 2020-06-20 NOTE — ASSESSMENT & PLAN NOTE
Patient has Cr of 1.9 on admission (baseline 1.6-1.7).  - Likely pre renal in setting of sepsis of dehydration  - Patient received 2.7L in ED    Plan  - Urine lytes ordered  - monitor I/O  - avoid nephrotoxic meds  - daily CMPs

## 2020-06-21 NOTE — HOSPITAL COURSE
Patient was admitted to Hospital Medicine on 6/20 for urosepsis and was started on ceftriaxone 1g QD. Urine Cultures returning with GNR, >100K cfu'S- pending speciation and susceptibilities. Patient's BP improved overnight after receiving sepsis protocol fluids (30cc/kg = 3L). The patient presented with an BRENNA on admission that also improved with fluid resuscitation. Urine lytes within normal limits. Patient's mentation improved from prior and more engaged/cooperative with plan. US of the retroperitoneum with evidence of chronic renal disease, no evidence of hydronephrosis.  Blood cultures remain NGTD. PT/OT recommending HH- CM/SW referrals sent. Will plan to DC to  with a total 7 day course of antibiotics- will DC on Bactrim. Patient agreeable to plan.

## 2020-06-21 NOTE — SUBJECTIVE & OBJECTIVE
Interval History: NAEON. Patient is sleeping comfortably in bed. He is not responsive to awakening, he continues to grunt and roll in the opposite direction.    Review of Systems   Unable to perform ROS: Patient unresponsive     Objective:     Vital Signs (Most Recent):  Temp: 98.2 °F (36.8 °C) (06/21/20 0814)  Pulse: 74 (06/21/20 0814)  Resp: 12 (06/21/20 0814)  BP: (!) 120/55 (06/21/20 0814)  SpO2: (!) 94 % (06/21/20 1051) Vital Signs (24h Range):  Temp:  [97.9 °F (36.6 °C)-100.9 °F (38.3 °C)] 98.2 °F (36.8 °C)  Pulse:  [] 74  Resp:  [12-22] 12  SpO2:  [94 %-99 %] 94 %  BP: ()/(53-67) 120/55     Weight: 87.8 kg (193 lb 9 oz)  Body mass index is 27.77 kg/m².    Intake/Output Summary (Last 24 hours) at 6/21/2020 1052  Last data filed at 6/21/2020 0600  Gross per 24 hour   Intake 3121 ml   Output 275 ml   Net 2846 ml      Physical Exam  Vitals signs and nursing note reviewed.   Constitutional:       General: He is not in acute distress.     Appearance: Normal appearance. He is not ill-appearing or toxic-appearing.   HENT:      Ears:      Comments: Very hard of hearing, has hearing aid in left ear  Cardiovascular:      Rate and Rhythm: Normal rate and regular rhythm.      Pulses: Normal pulses.      Heart sounds: No murmur.   Pulmonary:      Effort: Pulmonary effort is normal. No respiratory distress.      Breath sounds: No rales.   Musculoskeletal:         General: Swelling present.      Right lower leg: Edema (1+ to shin) present.      Left lower leg: Edema (1+ to shin) present.   Neurological:      Comments: Patient is sleeping and not willing to participate in interview and examination.         Significant Labs:   Blood Culture:   Recent Labs   Lab 06/20/20  1417   LABBLOO No Growth to date  No Growth to date     CBC:   Recent Labs   Lab 06/20/20  1308 06/21/20  0415   WBC 10.59 13.59*   HGB 11.1* 11.4*   HCT 35.6* 35.0*    159     CMP:   Recent Labs   Lab 06/20/20  1308 06/21/20  0415     144   K 3.8 3.9   * 113*   CO2 21* 22*   * 120*   BUN 40* 30   CREATININE 1.8* 1.5*   CALCIUM 8.3* 8.2*   PROT 5.9* 5.8*   ALBUMIN 3.2* 3.0*   BILITOT 0.4 0.4   ALKPHOS 45* 50*   AST 13 15   ALT 14 14   ANIONGAP 10 9   EGFRNONAA 31.1* 38.7*     Lactic Acid:   Recent Labs   Lab 06/20/20  1308 06/20/20  1839 06/21/20  0415   LACTATE 2.6* 2.7* 1.3     TSH:   Recent Labs   Lab 06/20/20  1456   TSH 1.208       Significant Imaging: I have reviewed and interpreted all pertinent imaging results/findings within the past 24 hours.

## 2020-06-21 NOTE — PROGRESS NOTES
"[facility] Hospital Medicine  Progress Note    Patient Name: Asaf Pendleton  MRN: [id number]  Patient Class: OP- Observation   Admission Date: 6/20/2020  Length of Stay: 0 days  Attending Physician: Manuel Dueñas MD  Primary Care Provider: Hector Aguirre MD    [facility] Medicine Team: [facility] 2 Sravanthi Yadav MD    Subjective:     Principal Problem:Sepsis        HPI:  Mr. Pendleton is a 89 yo male with prostate cancer and asthma who is hard of hearing presenting with AMS and hypotension. History provided by wife as patient is irritated and hard of hearing and understanding.     Wife states that early this morning, the patient had diarrhea. He had many loose stools which were foul smelling. Upon returning back to bed after one of his bowel movements, he slid and fell to the ground. The wife is sure he did not lose consciousness but is not sure whether he hit his head. She helped him back into bed. A few hours later, he got up to take a shower, and yelled for his wife. She came in the bathroom to find him sitting down in the tub. He then said to her, "help me help me" and his eyes "went to the back of his head" and he became unresponsive for a minute or so. Her son lives with them and she called for the son and by the time he got there, the patient was more alert. They decided to call EMS who told them that his BP was very low and took him to the hospital.    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 He does not smoke, only drinks out to dinner occasionally with is wife, and uses no recreational drugs.     ED Course: Cr of 1.8, lactate 2.6, U/A with bacteria, 1+ blood, +nitrities, 3+ leukocytes. Patient was given 30cc/kg of IVF as well as 1 dose of vanc/zosyn. CXR, CT H WOut Contrast and CT Cervical Spine WOut Contrast showed no abnormal findings.     Overview/Hospital Course:  Patient was admitted to Hospital Medicine and started on ceftriaxone 1g for UTI. Urine " Cultures pending. Patient's BP improved overnight after receiving sepsis protocol fluids (30cc/kg = 3L) and ceftriaxone. Patient had BRENNA with admission that improved the following morning. Urine lytes all normal. Awaiting Urine Cultures to tailor antibiotic regimen.     Interval History: NAEON. Patient is sleeping comfortably in bed. He is not responsive to awakening, he continues to grunt and roll in the opposite direction.    Review of Systems   Unable to perform ROS: Patient unresponsive     Objective:     Vital Signs (Most Recent):  Temp: 98.2 °F (36.8 °C) (06/21/20 0814)  Pulse: 74 (06/21/20 0814)  Resp: 12 (06/21/20 0814)  BP: (!) 120/55 (06/21/20 0814)  SpO2: (!) 94 % (06/21/20 1051) Vital Signs (24h Range):  Temp:  [97.9 °F (36.6 °C)-100.9 °F (38.3 °C)] 98.2 °F (36.8 °C)  Pulse:  [] 74  Resp:  [12-22] 12  SpO2:  [94 %-99 %] 94 %  BP: ()/(53-67) 120/55     Weight: 87.8 kg (193 lb 9 oz)  Body mass index is 27.77 kg/m².    Intake/Output Summary (Last 24 hours) at 6/21/2020 1052  Last data filed at 6/21/2020 0600  Gross per 24 hour   Intake 3121 ml   Output 275 ml   Net 2846 ml      Physical Exam  Vitals signs and nursing note reviewed.   Constitutional:       General: He is not in acute distress.     Appearance: Normal appearance. He is not ill-appearing or toxic-appearing.   HENT:      Ears:      Comments: Very hard of hearing, has hearing aid in left ear  Cardiovascular:      Rate and Rhythm: Normal rate and regular rhythm.      Pulses: Normal pulses.      Heart sounds: No murmur.   Pulmonary:      Effort: Pulmonary effort is normal. No respiratory distress.      Breath sounds: No rales.   Musculoskeletal:         General: Swelling present.      Right lower leg: Edema (1+ to shin) present.      Left lower leg: Edema (1+ to shin) present.   Neurological:      Comments: Patient is sleeping and not willing to participate in interview and examination.         Significant Labs:   Blood Culture:  "  Recent Labs   Lab 06/20/20  1417   LABBLOO No Growth to date  No Growth to date     CBC:   Recent Labs   Lab 06/20/20  1308 06/21/20  0415   WBC 10.59 13.59*   HGB 11.1* 11.4*   HCT 35.6* 35.0*    159     CMP:   Recent Labs   Lab 06/20/20  1308 06/21/20  0415    144   K 3.8 3.9   * 113*   CO2 21* 22*   * 120*   BUN 40* 30   CREATININE 1.8* 1.5*   CALCIUM 8.3* 8.2*   PROT 5.9* 5.8*   ALBUMIN 3.2* 3.0*   BILITOT 0.4 0.4   ALKPHOS 45* 50*   AST 13 15   ALT 14 14   ANIONGAP 10 9   EGFRNONAA 31.1* 38.7*     Lactic Acid:   Recent Labs   Lab 06/20/20  1308 06/20/20  1839 06/21/20  0415   LACTATE 2.6* 2.7* 1.3     TSH:   Recent Labs   Lab 06/20/20  1456   TSH 1.208       Significant Imaging: I have reviewed and interpreted all pertinent imaging results/findings within the past 24 hours.           Assessment/Plan:      * Sepsis  Patient meets sepsis criteria with HR>90, suspected source (UTI), lactic acidosis and was given 30cc/kg of IVF (2.7L).   - U/A + for 6 RBC, >100 WBC, moderate bacteria, +nitrite  - CXR shows no abnormal findings  - Patient was given 1x vanc/zosyn in the ED  - patient had fever and increased WCC overnight of admission    Plan  - UCx in process  - Blood Cx x2 NGTD  - monitor vitals  - ceftriaxone 1g daily    BRENNA (acute kidney injury)  Patient has Cr of 1.9 on admission (baseline 1.6-1.7).  - Likely pre renal in setting of sepsis of dehydration  - Patient received 2.7L in ED  - Cr improved to 1.5 on 6/21, at baseline  - Urine lytes WNL    Plan  - resolved  - monitor I/O  - avoid nephrotoxic meds  - daily CMPs      Discharge planning issues  Dispo:  - Given patient is 89 and wife reports weakness, PT/OT consulted, appreciate recs    Follow Up:  - PCP        Delirium  Patient's wife reports that he was very weak saying "help me" and possibly lost consciousness for a few seconds this morning. Wife now says that patient is at baseline but he is irritated, confused, and not " willing to participate in interview and examination.    Plan  - Given age and infection, delirium precautions placed        VTE Risk Mitigation (From admission, onward)         Ordered     heparin (porcine) injection 5,000 Units  Every 8 hours      06/20/20 1741     IP VTE HIGH RISK PATIENT  Once      06/20/20 1741     Place sequential compression device  Until discontinued      06/20/20 1741                      Sravanthi Yadav MD  Department of Hospital Medicine   [facility]

## 2020-06-21 NOTE — PT/OT/SLP EVAL
"Physical Therapy Evaluation    Patient Name:  Asaf Pendleton   MRN:  [id number]    Recommendations:     Discharge Recommendations:  nursing facility, skilled   Discharge Equipment Recommendations: other (see comments)(TBD)   Barriers to discharge: decreased functional mobility    Assessment:     Asaf Pendleton is a 89 y.o. male admitted with a medical diagnosis of Sepsis.  He presents with the following impairments/functional limitations:  weakness, impaired endurance, impaired self care skills, impaired functional mobilty, gait instability, impaired balance, decreased safety awareness.  Tolerated session c c/o fatigue.  Pt lethargic throughout session and provided minimal participation on initial evaluation.  Pt very [place] and would shut eyes after acknowledging therapists.  Pt found soiled upon entry and resisted therapists' attempts to help clean pt and change sheets.  Pt performed mobility c total A - no transfer/gait assessment d/t poor participation.  Pt demo appropriate BLE ROM and strength to perform mobility but unwilling.  Pt would benefit from continued skilled acute PT 4x/wk to improve functional mobility.  Recommending pt receive PT services in SNF setting following d/c from hospital once medically cleared.  Possible change in d/c recs pending participation and mobility.      Rehab Prognosis: Fair; patient would benefit from acute skilled PT services to address these deficits and reach maximum level of function.    Recent Surgery: * No surgery found *      Plan:     During this hospitalization, patient to be seen 4 x/week to address the identified rehab impairments via gait training, therapeutic activities, therapeutic exercises, neuromuscular re-education and progress toward the following goals:    · Plan of Care Expires:  07/16/20    Subjective     Chief Complaint: none voices  Patient/Family Comments/goals: "I don't want to get up right now."  Pain/Comfort:  · Pain Rating 1: 0/10    Patients " cultural, spiritual, [employer]ism conflicts given the current situation: no    Living Environment:  Pt c minimal communication on initial evaluation and very [place].  Per chart pt lives c wife and son - home environment not obtained  Prior to admission, patients level of function was Unable to obtain on initial eval.  Equipment used at home: other (see comments)(unable to obtain on initial eval).  DME owned (not currently used): none.  Upon discharge, patient will have assistance from family.    Objective:     Communicated with RN and OT prior to session.  Patient found left sidelying with telemetry, peripheral IV, bed alarm  upon PT entry to room.    General Precautions: Standard, fall   Orthopedic Precautions:N/A   Braces: N/A     Exams:  · Cognitive Exam:  Patient is oriented to unable to assess secondary to [place] and limited communication from pt  · RLE ROM: WFL  · RLE Strength: unable to assess secondary to poor participation and command following but demo at least 3/5  · LLE ROM: WFL  · LLE Strength: unable to assess secondary to poor participation and command following but demo at least 3/5    Functional Mobility:  · Bed Mobility:     · Rolling Left:  total assistance  · Rolling Right: total assistance  · Scooting: total assistance    Therapeutic Activities and Exercises:  Pt educated on: PT role/POC; safety c mobility; benefits of OOB activities; performing therex; d/c recs - unable to assess learning  -bed mobility performed for laron-care and bedding change as pt soiled  -repositioned for comfort    AM-PAC 6 CLICK MOBILITY  Total Score:11     Patient left HOB elevated with all lines intact, call button in reach, bed alarm on and RN notified.    GOALS:   Multidisciplinary Problems     Physical Therapy Goals        Problem: Physical Therapy Goal    Goal Priority Disciplines Outcome Goal Variances Interventions   Physical Therapy Goal     PT, PT/OT Ongoing, Progressing     Description: Goals to be met by: 7/12/2020      Patient will increase functional independence with mobility by performin. Supine to sit with Stand-by Assistance  2. Sit to supine with Stand-by Assistance  3. Sit to stand transfer with Minimal Assistance  4. Bed to chair transfer with Minimal Assistance using LRAD  5. Gait  x 50 feet with Minimal Assistance using LRAD                        History:     Past Medical History:   Diagnosis Date    Asthma     Comitant strabismus 3/1/2017    Hemifacial spasm     Lumbar spondylosis 2014    Macular degeneration - Both Eyes 2012    Prostate cancer     Squamous cell carcinoma        Past Surgical History:   Procedure Laterality Date    CATARACT EXTRACTION      right hip replacement      SPINE SURGERY      Decomp/lami L2-5    VASECTOMY         Time Tracking:     PT Received On: 20  PT Start Time: 0831     PT Stop Time: 0850  PT Total Time (min): 19 min     Billable Minutes: Evaluation 8 min and Therapeutic Activity 11 min      Brian Gamez, PT  2020

## 2020-06-21 NOTE — PLAN OF CARE
Problem: Physical Therapy Goal  Goal: Physical Therapy Goal  Description: Goals to be met by: 2020     Patient will increase functional independence with mobility by performin. Supine to sit with Stand-by Assistance  2. Sit to supine with Stand-by Assistance  3. Sit to stand transfer with Minimal Assistance  4. Bed to chair transfer with Minimal Assistance using LRAD  5. Gait  x 50 feet with Minimal Assistance using LRAD       Outcome: Ongoing, Progressing   Eval completed and POC established    Brian Gamez PT,DPT  2020

## 2020-06-21 NOTE — ASSESSMENT & PLAN NOTE
Patient has Cr of 1.9 on admission (baseline 1.6-1.7).  - Likely pre renal in setting of sepsis of dehydration  - Patient received 2.7L in ED  - Cr improved to 1.5 on 6/21, at baseline  - Urine lytes WNL    Plan  - resolved  - monitor I/O  - avoid nephrotoxic meds  - daily CMPs

## 2020-06-21 NOTE — PLAN OF CARE
Pt POC was reviewed with him and his wife they both verbalized understanding. His hearing aid was brought in by his wife and communication has improved between myself and pt. He hasn't had any falls or injuries during this shift.

## 2020-06-21 NOTE — PLAN OF CARE
Problem: Occupational Therapy Goal  Goal: Occupational Therapy Goal  Description: Goals to be met by: 6/28/20    Patient will increase functional independence with ADLs by performing:    UE Dressing with Stand-by Assistance.  LE Dressing with Minimal Assistance.  Grooming while seated with Minimal Assistance.  Toileting from bedside commode with Moderate Assistance for hygiene and clothing management.   Supine to sit with Minimal Assistance.  Toilet transfer to bedside commode with Contact Guard Assistance.    Outcome: Ongoing, Progressing   Evaluation completed. Initiate POC.   Diana Richards OT  6/21/2020

## 2020-06-21 NOTE — ASSESSMENT & PLAN NOTE
Patient meets sepsis criteria with HR>90, suspected source (UTI), lactic acidosis and was given 30cc/kg of IVF (2.7L).   - U/A + for 6 RBC, >100 WBC, moderate bacteria, +nitrite  - CXR shows no abnormal findings  - Patient was given 1x vanc/zosyn in the ED  - patient had fever and increased WCC overnight of admission    Plan  - UCx in process  - Blood Cx x2 NGTD  - monitor vitals  - ceftriaxone 1g daily

## 2020-06-21 NOTE — PT/OT/SLP EVAL
Occupational Therapy   Evaluation    Name: Asaf Pendleton  MRN: [id number]  Admitting Diagnosis:  Sepsis      Recommendations:     Discharge Recommendations: nursing facility, skilled  Discharge Equipment Recommendations:  (TBD pending progress)  Barriers to discharge:  None    Assessment:     Asaf Pendleton is a 89 y.o. male with a medical diagnosis of Sepsis.  He presents lethargic and increased difficulty to arouse for participation despite verbal/tactile cues. Pt soiled upon arrival requiring total assistance for perineal care at bed level. Performance deficits affecting function: weakness, impaired endurance, impaired self care skills, impaired functional mobilty, gait instability, impaired balance, visual deficits, decreased safety awareness. Pt required increased level of assistance with ADL and functional mobiltiy this date mostly 2/2 cog status. Anticipate pt will progress quickly with improved level of alertness. He would benefit from SNF following d/c to continue to progress towards goals and improve quality of life.     Rehab Prognosis: Good; patient would benefit from acute skilled OT services to address these deficits and reach maximum level of function.       Plan:     Patient to be seen 4 x/week to address the above listed problems via self-care/home management, therapeutic activities, therapeutic exercises  · Plan of Care Expires: 07/20/20  · Plan of Care Reviewed with: patient    Subjective     Chief Complaint: No complaints   Patient/Family Comments/goals: Return to PLOF     Occupational Profile: Unable to obtain 2/2 cog status/no phone # available to call family  Per chart review he live with wife and son; pt is a retired general surgeon     Pain/Comfort:  · Pain Rating 1: (no signs or symptoms of pain)    Patients cultural, spiritual, Worship conflicts given the current situation: no    Objective:     Communicated with: RN prior to session.  Patient found supine and soiled with telemetry,  peripheral IV, bed alarm, pulse ox (continuous) upon OT entry to room.    General Precautions: Standard, fall(hearing and visual impaired)   Orthopedic Precautions:N/A   Braces: N/A     Occupational Performance:    Bed Mobility:    · Patient completed Rolling/Turning to Left with  total assistance  · Patient completed Rolling/Turning to Right with total assistance  ** increased assistance 2/2 level of alertness     Functional Mobility/Transfers:  · Not test. Unsafe at this time 2/2 level of alertness    Activities of Daily Living:  · Upper Body Dressing: total assistance to doff/armand gown while supine  · Lower Body Dressing: total assistance to armand fresh socks while supine  · Toileting: total assistance to complete perineal care while supine at bed level    Cognitive/Visual Perceptual:  Cognitive/Psychosocial Skills:     -       Oriented to: NISHA   -       Follows Commands/attention:Follow 0 commands 2/2 lethargy  -       Communication: did not voice words  -       Safety awareness/insight to disability: impaired   -       Mood/Affect/Coping skills/emotional control: lethargic  Visual/Perceptual:      -Impaired    Hearing  -Impaired    Physical Exam:  Postural examination/scapula alignment:    -       Rounded shoulders  Skin integrity: Visible skin intact  Edema:  None noted  Dominant hand:    -       RUE  Upper Extremity Range of Motion:     -  Unable to fully assess 2/2 lethargy    Strength:    -       Right Upper Extremity: WFL  -       Left Upper Extremity: WFL    AMPAC 6 Click ADL:  AMPAC Total Score: 6    Treatment & Education:  -Pt edu on OT role/POC- no family in room  -White board updated  - Multiple self care tasks completed--as noted above  - All questions/concerns answered within OT scope of practiceEducation:      Patient left supine with all lines intact, call button in reach and RN notified    GOALS:   Multidisciplinary Problems     Occupational Therapy Goals        Problem: Occupational Therapy  Goal    Goal Priority Disciplines Outcome Interventions   Occupational Therapy Goal     OT, PT/OT Ongoing, Progressing    Description: Goals to be met by: 6/28/20    Patient will increase functional independence with ADLs by performing:    UE Dressing with Stand-by Assistance.  LE Dressing with Minimal Assistance.  Grooming while seated with Minimal Assistance.  Toileting from bedside commode with Moderate Assistance for hygiene and clothing management.   Supine to sit with Minimal Assistance.  Toilet transfer to bedside commode with Contact Guard Assistance.                     History:     Past Medical History:   Diagnosis Date    Asthma     Comitant strabismus 3/1/2017    Hemifacial spasm     Lumbar spondylosis 1/8/2014    Macular degeneration - Both Eyes 8/24/2012    Prostate cancer     Squamous cell carcinoma        Past Surgical History:   Procedure Laterality Date    CATARACT EXTRACTION      right hip replacement      SPINE SURGERY      Decomp/lami L2-5    VASECTOMY         Time Tracking:     OT Date of Treatment: 06/21/20  OT Start Time: 0833  OT Stop Time: 0851  OT Total Time (min): 18 min    Billable Minutes:Evaluation 10  Self Care/Home Management 8    Diana Richards OT  6/21/2020

## 2020-06-22 PROBLEM — E83.39 HYPOPHOSPHATEMIA: Status: ACTIVE | Noted: 2020-01-01

## 2020-06-22 PROBLEM — N17.9 AKI (ACUTE KIDNEY INJURY): Status: RESOLVED | Noted: 2020-01-01 | Resolved: 2020-01-01

## 2020-06-22 NOTE — ASSESSMENT & PLAN NOTE
Patient meets sepsis criteria with HR>90, suspected source (UTI), lactic acidosis and was given 30cc/kg of IVF (2.7L).   - U/A + for 6 RBC, >100 WBC, moderate bacteria, +nitrite  - CXR shows no abnormal findings  - Patient was given 1x vanc/zosyn in the ED  - patient had fever and increased WCC overnight of admission    Plan  - Pending urine culture- GNR with >100K cfu's, pending speciation and susceptibilities   - Blood Cx x2 NGTD  - Continue Rocephin 1 QD for 7 days- will transition to po Levaquin at DC

## 2020-06-22 NOTE — PLAN OF CARE
[facility]    HOME HEALTH ORDERS  FACE TO FACE ENCOUNTER    PENDING FINAL MED REC    Patient Name: Asaf Pendleton  YOB: 1924    PCP: Hector Aguirre MD   PCP Address: [address] [place] [facility] [place] [address]  PCP Phone Number: [phone]  PCP Fax: [phone]    Encounter Date: 06/22/2020    Admit to Home Health    Diagnoses:  Active Hospital Problems    Diagnosis  POA    *Sepsis [A41.9]  Yes    Delirium [R41.0]  Yes    Discharge planning issues [Z02.9]  Not Applicable    BRENNA (acute kidney injury) [N17.9]  Yes      Resolved Hospital Problems   No resolved problems to display.       No future appointments.        I have seen and examined this patient face to face today. My clinical findings that support the need for the home health skilled services and home bound status are the following:  Weakness/numbness causing balance and gait disturbance due to Weakness/Debility making it taxing to leave home.    Allergies:  Review of patient's allergies indicates:   Allergen Reactions    Codeine Other (See Comments)     Pt reports allergy to Codeine-pale, lightheaded       Diet: regular diet    Activities: activity as tolerated    Nursing:   SN to complete comprehensive assessment including routine vital signs. Instruct on disease process and s/s of complications to report to MD. Review/verify medication list sent home with the patient at time of discharge  and instruct patient/caregiver as needed. Frequency may be adjusted depending on start of care date.    Notify MD if SBP > 160 or < 90; DBP > 90 or < 50; HR > 120 or < 50; Temp > 101      CONSULTS:    Physical Therapy to evaluate and treat. Evaluate for home safety and equipment needs; Establish/upgrade home exercise program. Perform / instruct on therapeutic exercises, gait training, transfer training, and Range of Motion.  Occupational Therapy to evaluate and treat. Evaluate home environment for safety and equipment needs.  Perform/Instruct on transfers, ADL training, ROM, and therapeutic exercises.   to evaluate for community resources/long-range planning.    MISCELLANEOUS CARE:  N/A    WOUND CARE ORDERS  n/a      Medications: Review discharge medications with patient and family and provide education.      Current Discharge Medication List      CONTINUE these medications which have NOT CHANGED    Details   albuterol (PROVENTIL) 2.5 mg /3 mL (0.083 %) nebulizer solution Take 2.5 mg by nebulization every 6 (six) hours as needed.        albuterol (PROVENTIL/VENTOLIN HFA) 90 mcg/actuation inhaler Inhale 2 puffs into the lungs every 4 (four) hours as needed for Wheezing or Shortness of Breath.  Qty: 1 Inhaler, Refills: 12    Associated Diagnoses: Asthma, mild intermittent, well-controlled      budesonide-formoterol 160-4.5 mcg (SYMBICORT) 160-4.5 mcg/actuation HFAA Inhale 2 puffs into the lungs 2 (two) times daily.  Qty: 1 Inhaler, Refills: 12    Associated Diagnoses: Asthma, mild intermittent, well-controlled      fluorouracil (EFUDEX) 5 % cream Use hs for 2 weeks  Qty: 40 g, Refills: 3    Associated Diagnoses: Multiple actinic keratoses      ibuprofen (ADVIL,MOTRIN) 600 MG tablet       !! testosterone (ANDROGEL) 20.25 mg/1.25 gram (1.62 %) GlPm USE 2 PUMPS EVERY MORNING AS DIRECTED  Qty: 75 g, Refills: 5    Associated Diagnoses: Low testosterone; Decreased testosterone level      !! testosterone (ANDROGEL) 20.25 mg/1.25 gram (1.62 %) GlPm USE 2 PUMPS EVERY MORNING AS DIRECTED  Qty: 75 g, Refills: 5    Associated Diagnoses: Low testosterone; Decreased testosterone level       !! - Potential duplicate medications found. Please discuss with provider.          I certify that this patient is confined to his home and needs physical therapy and occupational therapy.

## 2020-06-22 NOTE — PROGRESS NOTES
"[facility] Hospital Medicine  Progress Note    Patient Name: Asaf Pendleton  MRN: [id number]  Patient Class: IP- Inpatient   Admission Date: 6/20/2020  Length of Stay: 0 days  Attending Physician: Manuel Dueñas MD  Primary Care Provider: Hector Aguirre MD    [facility] Medicine Team: [facility] [place] [facility] 2 Tylor Boucher DO    Subjective:     Principal Problem:Sepsis        HPI:  Mr. Pendleton is a 89 yo male with prostate cancer and asthma who is hard of hearing presenting with AMS and hypotension. History provided by wife as patient is irritated and hard of hearing and understanding.     Wife states that early this morning, the patient had diarrhea. He had many loose stools which were foul smelling. Upon returning back to bed after one of his bowel movements, he slid and fell to the ground. The wife is sure he did not lose consciousness but is not sure whether he hit his head. She helped him back into bed. A few hours later, he got up to take a shower, and yelled for his wife. She came in the bathroom to find him sitting down in the tub. He then said to her, "help me help me" and his eyes "went to the back of his head" and he became unresponsive for a minute or so. Her son lives with them and she called for the son and by the time he got there, the patient was more alert. They decided to call EMS who told them that his BP was very low and took him to the hospital.    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 He does not smoke, only drinks out to dinner occasionally with is wife, and uses no recreational drugs.     ED Course: Cr of 1.8, lactate 2.6, U/A with bacteria, 1+ blood, +nitrities, 3+ leukocytes. Patient was given 30cc/kg of IVF as well as 1 dose of vanc/zosyn. CXR, CT H WOut Contrast and CT Cervical Spine WOut Contrast showed no abnormal findings.     Overview/Hospital Course:  Patient was admitted to Hospital Medicine on 6/20 for urosepsis and was started on " "ceftriaxone 1g QD. Urine Cultures returning with GNR, >100K cfu'S- pending speciation and susceptibilities. Patient's BP improved overnight after receiving sepsis protocol fluids (30cc/kg = 3L). The patient presented with an BRENNA on admission that also improved with fluid resuscitation. Urine lytes within normal limits. Patient's mentation improved from prior and more engaged/cooperative with plan. US of the retroperitoneum with evidence of chronic renal disease, no evidence of hydronephrosis.  Blood cultures remain NGTD. PT/OT recommending SNF. Will plan to DC to SNF with 7 day course of antibiotics- will DC on ciprofloxacin when accepted. Patient agreeable to plan.       Interval History:     Patient pleasant this am- hard of hearing but clearly able to articulate thoughts. Does not believe he needs to stay in the hospital as he is well enough to go home. Updated on PT/OT's recommendations, and agreeable to going to SNF if that is best. 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 Having BM's- having difficulty moving in and out bed, so having difficulty making it to the restroom. No other questions or concerns.     Review of Systems   Constitutional: Positive for fatigue. Negative for chills and fever.   HENT: Negative for congestion and rhinorrhea.    Respiratory: Negative for apnea, cough and shortness of breath.    Cardiovascular: Negative for chest pain, palpitations and leg swelling.   Gastrointestinal: Negative for nausea and vomiting.   Genitourinary: Negative for dysuria and frequency.   Musculoskeletal: Negative for arthralgias and myalgias.   Skin: Negative for rash and wound.   Neurological: Negative for dizziness and headaches.   Psychiatric/Behavioral: Negative for agitation and confusion.     Objective:     Vital Signs (Most Recent):  Temp: 98.4 °F (36.9 " °C) (20 1154)  Pulse: 66 (20 1154)  Resp: 16 (20 1154)  BP: (!) 112/55 (20 1154)  SpO2: 98 % (20 1154) Vital Signs (24h Range):  Temp:  [96.8 °F (36 °C)-99.4 °F (37.4 °C)] 98.4 °F (36.9 °C)  Pulse:  [58-80] 66  Resp:  [14-20] 16  SpO2:  [94 %-98 %] 98 %  BP: (112-143)/(55-62) 112/55     Weight: 87.8 kg (193 lb 9 oz)  Body mass index is 27.77 kg/m².    Intake/Output Summary (Last 24 hours) at 2020 1310  Last data filed at 2020 1135  Gross per 24 hour   Intake --   Output 75 ml   Net -75 ml      Physical Exam  Vitals signs and nursing note reviewed.   Constitutional:       General: He is not in acute distress.     Appearance: Normal appearance. He is not ill-appearing or toxic-appearing.   HENT:      Head: Normocephalic and atraumatic.      Right Ear: External ear normal.      Left Ear: External ear normal.      Ears:      Comments: Hard of hearing, has hearing aid in left ear     Nose: Nose normal.      Mouth/Throat:      Mouth: Mucous membranes are moist.   Eyes:      General: No scleral icterus.     Conjunctiva/sclera: Conjunctivae normal.      Pupils: Pupils are equal, round, and reactive to light.   Cardiovascular:      Rate and Rhythm: Normal rate and regular rhythm.      Pulses: Normal pulses.           Radial pulses are 2+ on the right side and 2+ on the left side.      Heart sounds: No murmur.   Pulmonary:      Effort: Pulmonary effort is normal. No respiratory distress.      Breath sounds: No rales.   Abdominal:      General: Abdomen is flat. Bowel sounds are normal. There is no distension.      Palpations: Abdomen is soft.      Tenderness: There is abdominal tenderness (mild, diffuse, non- focal). There is no guarding or rebound.   Musculoskeletal:         General: Swelling present.      Right lower le+ Edema present.      Left lower le+ Edema present.   Skin:     General: Skin is warm and dry.      Coloration: Skin is not jaundiced.      Findings: No bruising  "or erythema.   Neurological:      General: No focal deficit present.      Mental Status: He is alert and oriented to person, place, and time.      Comments: Patient is sleeping and not willing to participate in interview and examination.         Significant Labs:   Blood Culture:   Recent Labs   Lab 06/20/20  1417   LABBLOO No Growth to date  No Growth to date  No Growth to date  No Growth to date     CBC:   Recent Labs   Lab 06/21/20  0415 06/22/20  0626   WBC 13.59* 9.91   HGB 11.4* 10.6*   HCT 35.0* 33.4*    135*     CMP:   Recent Labs   Lab 06/21/20  0415 06/22/20  0626    144   K 3.9 3.6   * 116*   CO2 22* 23   * 97   BUN 30 25   CREATININE 1.5* 1.4   CALCIUM 8.2* 7.9*   PROT 5.8* 5.6*   ALBUMIN 3.0* 2.7*   BILITOT 0.4 0.3   ALKPHOS 50* 47*   AST 15 19   ALT 14 15   ANIONGAP 9 5*   EGFRNONAA 38.7* 42.1*     Lactic Acid:   Recent Labs   Lab 06/20/20  1839 06/21/20  0415   LACTATE 2.7* 1.3     TSH:   Recent Labs   Lab 06/20/20  1456   TSH 1.208       Significant Imaging: I have reviewed and interpreted all pertinent imaging results/findings within the past 24 hours.           Assessment/Plan:      * Sepsis 2/2 UTI- GNR   Patient meets sepsis criteria with HR>90, suspected source (UTI), lactic acidosis and was given 30cc/kg of IVF (2.7L).   - U/A + for 6 RBC, >100 WBC, moderate bacteria, +nitrite  - CXR shows no abnormal findings  - Patient was given 1x vanc/zosyn in the ED  - patient had fever and increased WCC overnight of admission    Plan  - Pending urine culture- GNR with >100K cfu's, pending speciation and susceptibilities   - Blood Cx x2 NGTD  - Continue Rocephin 1 QD for 7 days- will transition to po Levaquin at DC     Hypophosphatemia  - Replaced      Discharge planning issues  Dispo:  - Given patient is 89 and wife reports weakness, PT/OT consulted, appreciate recs    Follow Up:  - PCP        Delirium  Patient's wife reports that he was very weak saying "help me" and possibly " lost consciousness for a few seconds this morning. Wife now says that patient is at baseline but he is irritated, confused, and not willing to participate in interview and examination.    Plan  - Given age and infection, delirium precautions placed        VTE Risk Mitigation (From admission, onward)         Ordered     heparin (porcine) injection 5,000 Units  Every 8 hours      06/20/20 1741     IP VTE HIGH RISK PATIENT  Once      06/20/20 1741     Place sequential compression device  Until discontinued      06/20/20 1741                      Tylor Boucher DO  Department of Hospital Medicine   [facility]

## 2020-06-22 NOTE — PLAN OF CARE
Problem: Occupational Therapy Goal  Goal: Occupational Therapy Goal  Description: Goals to be met by: 6/28/20    Patient will increase functional independence with ADLs by performing:    UE Dressing with Stand-by Assistance.  LE Dressing with Minimal Assistance.  Grooming while standing with Minimal Assistance.  Toileting from standard commode with Supervision for hygiene and clothing management.   Supine to sit with Supervision.  Toilet transfer to standard commode with Contact Guard Assistance.    Pt progressing well towards goals. Pt met goals related to toileting and bed mobility today. Goals updated to correlate with pt progression.  Barbara Chaudhry OT  6/22/2020    Outcome: Ongoing, Progressing

## 2020-06-22 NOTE — PLAN OF CARE
Referral sent to [facility].       06/22/20 1435   Post-Acute Status   Post-Acute Authorization Home Health   Home Health Status Referrals Sent     Flores Malloy LMSW  [facility]  [phone]

## 2020-06-22 NOTE — SUBJECTIVE & OBJECTIVE
"Interval History:     Patient pleasant this am- hard of hearing but clearly able to articulate thoughts. Does not believe he needs to stay in the hospital as he is well enough to go home. Updated on PT/OT's recommendations, and agreeable to going to SNF if that is best. 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 Having BM's- having difficulty moving in and out bed, so having difficulty making it to the restroom. No other questions or concerns.     Review of Systems   Constitutional: Positive for fatigue. Negative for chills and fever.   HENT: Negative for congestion and rhinorrhea.    Respiratory: Negative for apnea, cough and shortness of breath.    Cardiovascular: Negative for chest pain, palpitations and leg swelling.   Gastrointestinal: Negative for nausea and vomiting.   Genitourinary: Negative for dysuria and frequency.   Musculoskeletal: Negative for arthralgias and myalgias.   Skin: Negative for rash and wound.   Neurological: Negative for dizziness and headaches.   Psychiatric/Behavioral: Negative for agitation and confusion.     Objective:     Vital Signs (Most Recent):  Temp: 98.4 °F (36.9 °C) (06/22/20 1154)  Pulse: 66 (06/22/20 1154)  Resp: 16 (06/22/20 1154)  BP: (!) 112/55 (06/22/20 1154)  SpO2: 98 % (06/22/20 1154) Vital Signs (24h Range):  Temp:  [96.8 °F (36 °C)-99.4 °F (37.4 °C)] 98.4 °F (36.9 °C)  Pulse:  [58-80] 66  Resp:  [14-20] 16  SpO2:  [94 %-98 %] 98 %  BP: (112-143)/(55-62) 112/55     Weight: 87.8 kg (193 lb 9 oz)  Body mass index is 27.77 kg/m².    Intake/Output Summary (Last 24 hours) at 6/22/2020 1310  Last data filed at 6/22/2020 1135  Gross per 24 hour   Intake --   Output 75 ml   Net -75 ml      Physical Exam  Vitals signs and nursing note reviewed.   Constitutional:       General: He is not in acute distress.     Appearance: Normal " appearance. He is not ill-appearing or toxic-appearing.   HENT:      Head: Normocephalic and atraumatic.      Right Ear: External ear normal.      Left Ear: External ear normal.      Ears:      Comments: Hard of hearing, has hearing aid in left ear     Nose: Nose normal.      Mouth/Throat:      Mouth: Mucous membranes are moist.   Eyes:      General: No scleral icterus.     Conjunctiva/sclera: Conjunctivae normal.      Pupils: Pupils are equal, round, and reactive to light.   Cardiovascular:      Rate and Rhythm: Normal rate and regular rhythm.      Pulses: Normal pulses.           Radial pulses are 2+ on the right side and 2+ on the left side.      Heart sounds: No murmur.   Pulmonary:      Effort: Pulmonary effort is normal. No respiratory distress.      Breath sounds: No rales.   Abdominal:      General: Abdomen is flat. Bowel sounds are normal. There is no distension.      Palpations: Abdomen is soft.      Tenderness: There is abdominal tenderness (mild, diffuse, non- focal). There is no guarding or rebound.   Musculoskeletal:         General: Swelling present.      Right lower le+ Edema present.      Left lower le+ Edema present.   Skin:     General: Skin is warm and dry.      Coloration: Skin is not jaundiced.      Findings: No bruising or erythema.   Neurological:      General: No focal deficit present.      Mental Status: He is alert and oriented to person, place, and time.      Comments: Patient is sleeping and not willing to participate in interview and examination.         Significant Labs:   Blood Culture:   Recent Labs   Lab 20  1417   LABBLOO No Growth to date  No Growth to date  No Growth to date  No Growth to date     CBC:   Recent Labs   Lab 20  0415 20  0626   WBC 13.59* 9.91   HGB 11.4* 10.6*   HCT 35.0* 33.4*    135*     CMP:   Recent Labs   Lab 20  0415 20  0626    144   K 3.9 3.6   * 116*   CO2 22* 23   * 97   BUN 30 25    CREATININE 1.5* 1.4   CALCIUM 8.2* 7.9*   PROT 5.8* 5.6*   ALBUMIN 3.0* 2.7*   BILITOT 0.4 0.3   ALKPHOS 50* 47*   AST 15 19   ALT 14 15   ANIONGAP 9 5*   EGFRNONAA 38.7* 42.1*     Lactic Acid:   Recent Labs   Lab 06/20/20  1839 06/21/20  0415   LACTATE 2.7* 1.3     TSH:   Recent Labs   Lab 06/20/20  1456   TSH 1.208       Significant Imaging: I have reviewed and interpreted all pertinent imaging results/findings within the past 24 hours.

## 2020-06-22 NOTE — PLAN OF CARE
More oriented tonight, ambulates to the bathroom with walker and standby assist. Had 4 times loose bowel movement overnight, on call team informed, advised to monitor. IV antibiotics continued. Slept from midnight till 6am.

## 2020-06-22 NOTE — PLAN OF CARE
Patient discharging home with [facility].    Nurse, patient, and patient wife informed of discharge.       06/22/20 1609   Final Note   Assessment Type Final Discharge Note   Anticipated Discharge Disposition Home-Health       Flores Malloy LMSW  [facility]  [phone]

## 2020-06-22 NOTE — PLAN OF CARE
Problem: Physical Therapy Goal  Goal: Physical Therapy Goal  Description: Goals to be met by: 2020     Patient will increase functional independence with mobility by performin. Supine to sit with Stand-by Assistance = met 2020  2. Sit to supine with Stand-by Assistance = met 2020  3. Sit to stand transfer with Minimal Assistance = met 2020  4. Bed to chair transfer with Minimal Assistance using LRAD= met 2020  5. Gait  x 50 feet with Minimal Assistance using LRAD = met 2020  NEW GOAL 2020    6. Transfers w/ RW or LRAD w/ SBA= not met   7. Gait x 150 feet w/ SBA and RW or LRAD = not met      Patient progressing. Goals met as noted and new goals established.  Recommendation was for SNF; based on patient progress, now recommending Home Health SErvices/ PT/OT at time of discharge home.  Janie Liriano, PT 2020

## 2020-06-22 NOTE — PLAN OF CARE
06/22/20 1653   Final Note   Assessment Type Final Discharge Note   Anticipated Discharge Disposition Home-Health   Right Care Referral Info   Post Acute Recommendation Home-care   Facility Name [facility]

## 2020-06-22 NOTE — PROGRESS NOTES
Pt Dc per MD orders. Dc and prescription instructions given. Pt verbalizes understanding. PiV removed catheter tip intact. VSS. Pt waiting on escort for WC.

## 2020-06-22 NOTE — DISCHARGE SUMMARY
"[facility] Hospital Medicine  Discharge Summary      Patient Name: Asaf Pendleton  MRN: [id number]  Admission Date: 6/20/2020  Hospital Length of Stay: 0 days  Discharge Date and Time:  06/22/2020 3:38 PM  Attending Physician: Manuel Dueñas MD   Discharging Provider: Tylor Boucher DO  Primary Care Provider: Hector Aguirre MD  Hospital Medicine Team: [facility] 2 Tylor Boucher DO    HPI:   Mr. Pendleton is a 89 yo male with prostate cancer and asthma who is hard of hearing presenting with AMS and hypotension. History provided by wife as patient is irritated and hard of hearing and understanding.     Wife states that early this morning, the patient had diarrhea. He had many loose stools which were foul smelling. Upon returning back to bed after one of his bowel movements, he slid and fell to the ground. The wife is sure he did not lose consciousness but is not sure whether he hit his head. She helped him back into bed. A few hours later, he got up to take a shower, and yelled for his wife. She came in the bathroom to find him sitting down in the tub. He then said to her, "help me help me" and his eyes "went to the back of his head" and he became unresponsive for a minute or so. Her son lives with them and she called for the son and by the time he got there, the patient was more alert. They decided to call EMS who told them that his BP was very low and took him to the hospital.    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 He does not smoke, only drinks out to dinner occasionally with is wife, and uses no recreational drugs.     ED Course: Cr of 1.8, lactate 2.6, U/A with bacteria, 1+ blood, +nitrities, 3+ leukocytes. Patient was given 30cc/kg of IVF as well as 1 dose of vanc/zosyn. CXR, CT H WOut Contrast and CT Cervical Spine WOut Contrast showed no abnormal findings.     * No surgery found *      Hospital Course:   Patient was admitted to Hospital Medicine " on 6/20 for urosepsis and was started on ceftriaxone 1g QD. Urine Cultures returning with GNR, >100K cfu'S- pending speciation and susceptibilities. Patient's BP improved overnight after receiving sepsis protocol fluids (30cc/kg = 3L). The patient presented with an BRENNA on admission that also improved with fluid resuscitation. Urine lytes within normal limits. Patient's mentation improved from prior and more engaged/cooperative with plan. US of the retroperitoneum with evidence of chronic renal disease, no evidence of hydronephrosis.  Blood cultures remain NGTD. PT/OT recommending HH- CM/SW referrals sent. Will plan to DC to  with a total 7 day course of antibiotics- will DC on Bactrim. Patient agreeable to plan.        Consults:     No new Assessment & Plan notes have been filed under this hospital service since the last note was generated.  Service: Hospital Medicine    Final Active Diagnoses:    Diagnosis Date Noted POA    PRINCIPAL PROBLEM:  Sepsis 2/2 UTI- GNR  [A41.9] 06/20/2020 Yes    Hypophosphatemia [E83.39] 06/22/2020 Unknown    Delirium [R41.0] 06/20/2020 Yes    Discharge planning issues [Z02.9] 06/20/2020 Not Applicable      Problems Resolved During this Admission:    Diagnosis Date Noted Date Resolved POA    BRENNA (acute kidney injury) [N17.9] 06/20/2020 06/22/2020 Yes       Discharged Condition: stable    Disposition: Home-Health Care [facility]    Follow Up:    Patient Instructions:      Ambulatory referral/consult to Internal Medicine   Standing Status: Future   Referral Priority: Routine Referral Type: Consultation   Referral Reason: Specialty Services Required   Requested Specialty: Internal Medicine   Number of Visits Requested: 1       Significant Diagnostic Studies: Labs: All labs within the past 24 hours have been reviewed    Pending Diagnostic Studies:     None         Medications:  Reconciled Home Medications:      Medication List      START taking these medications     sulfamethoxazole-trimethoprim 800-160mg 800-160 mg Tab  Commonly known as: BACTRIM DS  Take 1 tablet by mouth 2 (two) times daily.        CONTINUE taking these medications    * albuterol 2.5 mg /3 mL (0.083 %) nebulizer solution  Commonly known as: PROVENTIL  Take 2.5 mg by nebulization every 6 (six) hours as needed.     * albuterol 90 mcg/actuation inhaler  Commonly known as: PROVENTIL/VENTOLIN HFA  Inhale 2 puffs into the lungs every 4 (four) hours as needed for Wheezing or Shortness of Breath.     budesonide-formoterol 160-4.5 mcg 160-4.5 mcg/actuation Hfaa  Commonly known as: SYMBICORT  Inhale 2 puffs into the lungs 2 (two) times daily.     fluorouraciL 5 % cream  Commonly known as: EFUDEX  Use hs for 2 weeks     ibuprofen 600 MG tablet  Commonly known as: ADVIL,MOTRIN     * testosterone 20.25 mg/1.25 gram (1.62 %) Glpm  Commonly known as: AndroGeL  USE 2 PUMPS EVERY MORNING AS DIRECTED     * testosterone 20.25 mg/1.25 gram (1.62 %) Glpm  Commonly known as: AndroGeL  USE 2 PUMPS EVERY MORNING AS DIRECTED         * This list has 4 medication(s) that are the same as other medications prescribed for you. Read the directions carefully, and ask your doctor or other care provider to review them with you.                Indwelling Lines/Drains at time of discharge:   Lines/Drains/Airways     None                 Time spent on the discharge of patient: 35 minutes  Patient was seen and examined on the date of discharge and determined to be suitable for discharge.         Tylor Boucher DO  Department of Hospital Medicine  [facility]

## 2020-06-22 NOTE — PLAN OF CARE
[facility]     Department of Hospital Medicine     [address] [place] [address]     [phone] after hours  [phone] fax       NURSING HOME ORDERS       PENDING FINAL MED REC    06/22/2020    Admit to Nursing Home:    Skilled Bed      Diagnoses:  Active Hospital Problems    Diagnosis  POA    *Sepsis [A41.9]  Yes    Delirium [R41.0]  Yes    Discharge planning issues [Z02.9]  Not Applicable    BRENNA (acute kidney injury) [N17.9]  Yes      Resolved Hospital Problems   No resolved problems to display.       Patient is homebound due to:  Sepsis    Allergies:  Review of patient's allergies indicates:   Allergen Reactions    Codeine Other (See Comments)     Pt reports allergy to Codeine-pale, lightheaded       Vitals:  Per facility protocol     Diet: Adult Regular    Supplement:  1 can every three times a day with meals                         Type:  Ensure        Acitivities:   Per facility protocol      LABS:  Per facility protocol    Nursing Precautions:    - Aspiration precautions:             - Total assistance with meals            -  Upright 90 degrees befor during and after meals             -  Suction at bedside          - Fall precautions per nursing home protocol   - Decubitus precautions:        -  for positioning   - Pressure reducing foam mattress   - Turn patient every two hours. Use wedge pillows to anchor patient    CONSULTS:     Physical Therapy to evaluate and treat     Occupational Therapy to evaluate and treat     Psychiatry to evaluate and follow patients for delirium    Medications:      Asaf Pendleton   Home Medication Instructions EDDIE:[id number]    Printed on:06/22/20 0726   Medication Information                      albuterol (PROVENTIL) 2.5 mg /3 mL (0.083 %) nebulizer solution  Take 2.5 mg by nebulization every 6 (six) hours as needed.               albuterol (PROVENTIL/VENTOLIN HFA) 90 mcg/actuation inhaler  Inhale 2  puffs into the lungs every 4 (four) hours as needed for Wheezing or Shortness of Breath.             budesonide-formoterol 160-4.5 mcg (SYMBICORT) 160-4.5 mcg/actuation HFAA  Inhale 2 puffs into the lungs 2 (two) times daily.             fluorouracil (EFUDEX) 5 % cream  Use hs for 2 weeks             ibuprofen (ADVIL,MOTRIN) 600 MG tablet               testosterone (ANDROGEL) 20.25 mg/1.25 gram (1.62 %) GlPm  USE 2 PUMPS EVERY MORNING AS DIRECTED             testosterone (ANDROGEL) 20.25 mg/1.25 gram (1.62 %) GlPm  USE 2 PUMPS EVERY MORNING AS DIRECTED                       _________________________________  Tylor Boucher,   06/22/2020

## 2020-06-22 NOTE — PT/OT/SLP PROGRESS
"Physical Therapy Treatment    Patient Name:  Asaf Pendleton   MRN:  [id number]    Recommendations:     Discharge Recommendations:  home with home health, home health PT, home health OT   Discharge Equipment Recommendations: walker, rolling   Barriers to discharge: None    Assessment:     Asaf Pendleton is a 89 y.o. male admitted with a medical diagnosis of Sepsis.  He presents with the following impairments/functional limitations:  impaired endurance, weakness, impaired balance, impaired functional mobilty, gait instability . Patient participated well. He ambulated w/ RW and CGA 95 feet. Transfers w/ RW and CGA. Discharge recommendations changed to home with family and home health services due to progress  And patient desire. Patient will benefit from continued physical therapy to address deficits and improve safety and functional mobility. Continue with physical therapy plan of care. .    Rehab Prognosis: Good; patient would benefit from acute skilled PT services to address these deficits and reach maximum level of function.    Recent Surgery: * No surgery found *      Plan:     During this hospitalization, patient to be seen 4 x/week to address the identified rehab impairments via gait training, therapeutic activities, therapeutic exercises and progress toward the following goals:    · Plan of Care Expires:  07/16/20    Subjective     Chief Complaint: "I was sleeping"  Patient/Family Comments/goals: return home to [facility]  Pain/Comfort:  · Pain Rating 1: 0/10  · Pain Rating Post-Intervention 1: 0/10      Objective:     Communicated with nurse prior to session.  Patient found left sidelying with telemetry(telesitter) upon PT entry to room.     General Precautions: Standard, fall   Orthopedic Precautions:N/A   Braces: N/A     Functional Mobility:  · Bed Mobility:     · Supine to Sit: stand by assistance  · Sit to Supine: stand by assistance  · Transfers:     · Sit to Stand:  contact guard assistance with rolling " walker  · Bed to Chair: contact guard assistance with  rolling walker  using  Step Transfer  · Gait: w/ RW and CGA in room 25 feet. after seated rest break in bedside chair; donns mask, ambulates w/ RW and CGA in hallway 95 feet. No LOB.      AM-PAC 6 CLICK MOBILITY  Turning over in bed (including adjusting bedclothes, sheets and blankets)?: 4  Sitting down on and standing up from a chair with arms (e.g., wheelchair, bedside commode, etc.): 3  Moving from lying on back to sitting on the side of the bed?: 4  Moving to and from a bed to a chair (including a wheelchair)?: 3  Need to walk in hospital room?: 3  Climbing 3-5 steps with a railing?: 2  Basic Mobility Total Score: 19       Therapeutic Activities and Exercises:   patient educated in PT POC, gait and trf tech; agreeable to change in d/c recs to HH  Nurse checked patient SpO2 after returning to room and =96%  Patient left HOB elevated with all lines intact, call button in reach, nurse notified and telesitter confirmed..    GOALS:   Multidisciplinary Problems     Physical Therapy Goals        Problem: Physical Therapy Goal    Goal Priority Disciplines Outcome Goal Variances Interventions   Physical Therapy Goal     PT, PT/OT Ongoing, Progressing     Description: Goals to be met by: 2020     Patient will increase functional independence with mobility by performin. Supine to sit with Stand-by Assistance = met 2020  2. Sit to supine with Stand-by Assistance = met 2020  3. Sit to stand transfer with Minimal Assistance = met 2020  4. Bed to chair transfer with Minimal Assistance using LRAD= met 2020  5. Gait  x 50 feet with Minimal Assistance using LRAD = met 2020  NEW GOAL 2020    6. Transfers w/ RW or LRAD w/ SBA= not met   7. Gait x 150 feet w/ SBA and RW or LRAD = not met                       Time Tracking:     PT Received On: 20  PT Start Time: 1052     PT Stop Time: 1106  PT Total Time (min): 14 min     Billable  Minutes: Gait Training 14    Treatment Type: Treatment  PT/PTA: PT     PTA Visit Number: 0     Janie Liriano, PT  06/22/2020

## 2020-06-22 NOTE — PLAN OF CARE
06/22/20 1615   Discharge Assessment   Assessment Type Discharge Planning Assessment   Confirmed/corrected address and phone number on facesheet? Yes   Assessment information obtained from?   (spouse)   Prior to hospitilization cognitive status: Alert/Oriented   Prior to hospitalization functional status: Needs Assistance;Assistive Equipment   Current cognitive status: Alert/Oriented   Current Functional Status: Needs Assistance;Assistive Equipment   Facility Arrived From: home   Lives With spouse   Able to Return to Prior Arrangements yes   Is patient able to care for self after discharge? No   Who are your caregiver(s) and their phone number(s)? spouse   Patient's perception of discharge disposition home health   Readmission Within the Last 30 Days no previous admission in last 30 days   Patient currently being followed by outpatient case management? No   Patient currently receives any other outside agency services? No   Equipment Currently Used at Home walker, standard;cane, straight   Do you have any problems affording any of your prescribed medications? No   Is the patient taking medications as prescribed? yes   Does the patient have transportation home? Yes   Transportation Anticipated family or friend will provide   Does the patient receive services at the [facility]? No   Discharge Plan A Home Health;Home with family   DME Needed Upon Discharge    (TBD)   Patient/Family in Agreement with Plan yes     Hector Aguirre MD  [address] [facility] [id number]    [id number] - [place], [place] - [address] AT [facility] [address]  [address]  [facility] [address]  Phone: [phone] Fax: [phone]    [facility] [address] [place] [address]  Phone: [phone] Fax: [phone]  Extended Emergency Contact Information  Primary Emergency Contact: Yanet Pendleton  Address: [address] [place] [address] [facility]  Home Phone: [phone]  Mobile Phone: [phone]  Relation: Spouse

## 2020-06-22 NOTE — PT/OT/SLP PROGRESS
Occupational Therapy   Treatment    Name: Asaf Pendleton  MRN: [id number]  Admitting Diagnosis:  Sepsis       Recommendations:     Discharge Recommendations: home health OT  Discharge Equipment Recommendations:  bedside commode, shower chair  Barriers to discharge:  None    Assessment:     Asaf Pendleton is a 89 y.o. male with a medical diagnosis of Sepsis.  He presents with impaired ADL and mobility performance deficits. Pt found sleeping however willing to participate in therapy. Pt explains at home he lives with his wife in a SS apartment with no MANDO. At baseline, pt is (I) with ADLs and mobilizes with a RW as needed. During session today, pt hard of hearing with L hearing aid donned. He completed bedroom and bathroom mobility for toileting task using RW and SBA-CGA required. Pt reporting his mobility near baseline at this time. POC updated from SNF to OT at this time. Performance deficits affecting function are weakness, impaired functional mobilty, impaired endurance, gait instability, decreased coordination, impaired self care skills, impaired cardiopulmonary response to activity, decreased safety awareness, impaired balance. Pt would benefit from continued OT skilled services 4x/wk to improve daily living skills to optimize QOL. Pt is recommended to discharge to OT at this time.      Rehab Prognosis:  Good; patient would benefit from acute skilled OT services to address these deficits and reach maximum level of function.       Plan:     Patient to be seen 4 x/week to address the above listed problems via self-care/home management, therapeutic activities, therapeutic exercises  · Plan of Care Expires: 07/20/20  · Plan of Care Reviewed with: patient    Subjective     Pain/Comfort:  · Pain Rating 1: 0/10  · Pain Rating Post-Intervention 1: 0/10    Objective:     Communicated with: RN prior to session.  Patient found HOB elevated with telemetry upon OT entry to room.    General Precautions: Standard, fall    Orthopedic Precautions:N/A   Braces: N/A     Occupational Performance:     Bed Mobility:    · Patient completed Rolling/Turning to Left with  stand by assistance  · Patient completed Scooting/Bridging with stand by assistance  · Patient completed Supine to Sit with stand by assistance  · Patient completed Sit to Supine with stand by assistance     Functional Mobility/Transfers:  · Patient completed Sit <> Stand Transfer with contact guard assistance  with  rolling walker   · Functional Mobility: Pt completed bedroom and bathroom mobility today with SBA-CGA with RW.     Activities of Daily Living:  · Feeding:  Setup (A) for meal tray (Breakfast) .  · Toileting: stand by assistance using standard commode in standing for urination      [facility] 6 Click ADL: 16    Treatment & Education:  Pt educated on role of occupational therapy, POC, and safety during ADLs and functional mobility. Pt and OT discussed importance of safe, continued mobility to optimize daily living skills. Pt verbalized understanding.   Pt completed the following during session: bed mobility, sit>stand t/f, bedroom and bathroom mobility, toileting, discussion related to SNF vs HH at D/C. White board updated during session. Pt given instruction to call for medical staff/nurse for assistance.       Patient left HOB elevated with all lines intact, call button in reach and RN Umu notifiedEducation:      GOALS:   Multidisciplinary Problems     Occupational Therapy Goals        Problem: Occupational Therapy Goal    Goal Priority Disciplines Outcome Interventions   Occupational Therapy Goal     OT, PT/OT Ongoing, Progressing    Description: Goals to be met by: 6/28/20    Patient will increase functional independence with ADLs by performing:    UE Dressing with Stand-by Assistance.  LE Dressing with Minimal Assistance.  Grooming while seated with Minimal Assistance.  Toileting from bedside commode with Moderate Assistance for hygiene and clothing management.    Supine to sit with Minimal Assistance.  Toilet transfer to bedside commode with Contact Guard Assistance.                     Time Tracking:     OT Date of Treatment: 06/22/20  OT Start Time: 0748  OT Stop Time: 0811  OT Total Time (min): 23 min    Billable Minutes:Self Care/Home Management 13 min  Therapeutic Activity 10 min    Barbara Chaudhry OT  6/22/2020

## 2020-06-23 NOTE — PATIENT INSTRUCTIONS
C3 nurse attempted to contact patient. No answer. The following message was left for the patient to return the call:  Good Morning  I am a nurse calling on behalf of [facility] from the [facility].  This is a Transitional Care Call for Asaf Pendleton . When you have a moment please contact us at [phone] or [phone] Monday through Friday, between the hours of 8 am to 4 pm. We look forward to speaking with you. On behalf of [facility] have a nice day.    The patient does not have a scheduled HOSFU appointment within 7-14 days post hospital discharge date 06/22/2020. Message sent to Physician staff to assist with HOSFU appointment scheduling.

## 2020-06-23 NOTE — PROGRESS NOTES
Please forward this important TCC information to your provider in order to maximize the post discharge care delivery of this patient.    C3 nurse spoke with Asaf Pendleton ( son)  for a TCC post hospital discharge follow up call. The patient does not have a scheduled HOSFU appointment with Hector Aguirre MD within 7-14 days post hospital discharge date 06/22/2020. C3 nurse was unable to schedule HOSFU appointment in [facility].  Please contact patient and schedule follow up appointment using HOSFU visit type on or before 07/06/2020    Respectfully,  Zena Haas LPN    [facility] C[facility]    [email or web address]       Please do not reply to this message, as this inbox is not routinely monitored.

## 2020-06-24 NOTE — PHYSICIAN QUERY
PT Name: Asaf Pendleton  MR #: [id number]     RENAL CONDITION CLARIFICATION     CDS/: JAROD Corral, RN, CDS               Contact information:[email or web address]   This form is a permanent document in the medical record.     Query Date: June 24, 2020    By submitting this query, we are merely seeking further clarification of documentation.  Please utilize your independent clinical judgment when addressing the question(s) below.    The Medical Record contains the following:   Indicator Supporting Clinical Findings Location in Medical Record    Kidney (Renal) Insufficiency     X Kidney (Renal) Failure/Injury  BRENNA (acute kidney injury)   Patient has Cr of 1.9 on admission (baseline 1.6-1.7).   Likely pre renal in setting of sepsis of dehydration    H&P, Dr. Yadav/Dr. Dueñas, 6/20    Nephrotoxic Agents     X BUN/Creatinine GFR  Patient has Cr of 1.9 on admission (baseline 1.6-1.7).     Cr improved to 1.5 on 6/21, at baseline     6/20 6/21 6/22   BUN, Bld 40 (H) 30 25   Creatinine 1.8 (H) 1.5 (H) 1.4   eGFR if non  31.1  38.7  42.1        H&P, Dr. Yadav/Dr. Dueñas, 6/20     , Dr. Yadav/Dr. Dueñas, 6/21     Lab 6/20- 6/22    Urine: Casts         Eosinophils     X Dehydration  Likely pre renal in setting of sepsis of dehydration    H&P, Dr. Yadav/Dr. Dueñas, 6/20      Nausea/Vomiting      Dialysis/CRRT     X Treatment:  Patient received 2.7L in ED     The patient presented with an BRENNA on admission that also improved with fluid resuscitation    H&P, Dr. Yadav/Dr. Dueñas, 6/20     , Dr. Boucher/Dr. Dueñas, 6/22   X Other:   Patient started having uncontrollable diarrhea yesterday and has since experienced malaise     Presenting with AMS and hypotension     US of the retroperitoneum with evidence of chronic renal disease, no evidence of hydronephrosis    ED, Dr. Downey, 6/20       H&P, Dr. Yadav/Dr. Dueñas, 6/20     , Dr. Boucher/Dr. Dueñas, 6/22     Acute Kidney  Injury / Acute Renal Failure has different defining criteria. A generally accepted guideline is:   A greater than 100% (2X) rise in serum creatinine from baseline* occurring during the course of a single hospital stay.   *Baseline as determined by the providers judgment and consideration of previous lab values and other documentation, if available.    A diagnosis of Acute Kidney Injury/Acute Renal Failure should incorporate abnormal labs and clinical findings that are clinically significant      References: 1. Josefina et al. Acute renal failure-definition, outcome measures, animal models, fluid therapy and information technology needs: the Second International Consensus Conference of the Acute Dialysis Quality Initiative (ADQI) Group. Crit Care 2004; 8:B204; 2. Samantha et al. Acute Kidney Injury Network: report of an initiative to improve outcomes in acute kidney injury. Crit Care 2007; 11:R31; 3. Kidney Disease: Improving Global Outcomes (KDIGO). Acute Kidney Injury Work Group. KDIGO clinical practice guidelines for acute kidney injury. Kidney Int Suppl 2012; 2:1.    The clinical guidelines noted above are only a system guideline. It does not replace the providers clinical judgment.     Provider, please specify the diagnosis or diagnoses associated with above clinical findings.    [  x  ] Unspecified Acute Kidney Failure/Injury       [    ] Other Acute Kidney Failure/Injury (please specify): ____________       [    ] Acute Renal Insufficiency  - Consider if SCr rise is transient and normalizes quickly with no efforts at real resuscitation of vital signs and perfusion     [    ] Other (please specify): _______________________________     [  ] Clinically Undetermined       Present on admission (POA) status:   [ x  ] Yes (Y)              [   ] Clinically Undetermined (W)             [   ] No (N)               [   ] Documentation insufficient to determine if condition is POA (U)     Please document in your progress  notes daily for the duration of treatment until resolved and include in your discharge summary.

## 2020-06-24 NOTE — PHYSICIAN QUERY
PT Name: Asaf Pendleton  MR #: [id number]     Physician Query Form - Documentation Clarification      CDS/: JAROD Corral, RN, CDS               Contact information:[email or web address]     This form is a permanent document in the medical record.     Query Date: June 24, 2020    By submitting this query, we are merely seeking further clarification of documentation. Please utilize your independent clinical judgment when addressing the question(s) below.    The Medical Record reflects the following:    Clinical Findings Location in Medical Record    Patient presents with Hypotension 78/42     Patient started having uncontrollable diarrhea yesterday and has since experienced malaise.      BP 90/53 HR 65 RR 22 Temp 99.9       Sepsis   Patient meets sepsis criteria with HR>90, suspected source (UTI), lactic acidosis and was given 30cc/kg of IVF (2.7L).    U/A + for 6 RBC, >100 WBC, moderate bacteria, +nitrite   Patient was given 1x vanc/zosyn in the   Ceftriaxone 1g daily   Patient received 2.7L in ED   Patient had fever and increased WCC overnight of admission       Patient was admitted to Hospital Medicine and started on ceftriaxone 1g for UTI      Patient's BP improved overnight after receiving sepsis protocol fluids (30cc/kg = 3L) and ceftriaxone. Patient had BRENNA with admission that improved the following morning     BRENNA (acute kidney injury)- Likely pre renal in setting of sepsis of dehydration     Vital Signs (24h Range):  Temp:  [97.9 °F (36.6 °C)-100.9 °F (38.3 °C)] 98.2 °F (36.8 °C)  Pulse:  [] 74  Resp:  [12-22] 12  SpO2:  [94 %-99 %] 94 %  BP: ()/(53-67) 120/55        Patient was admitted to Hospital Medicine on 6/20 for urosepsis and was started on ceftriaxone 1g QD. Urine Cultures returning with GNR, >100K cfu'S- pending speciation and susceptibilities.       Sepsis 2/2 UTI- GNR -Pending urine culture- GNR with >100K cfu's, pending speciation and susceptibilities   Blood Cx x2 NGTD    Continue Rocephin 1 QD for 7 days- will transition to po Levaquin at DC      Vital Signs (24h Range):  Temp:  [96.8 °F (36 °C)-99.4 °F (37.4 °C)] 98.4 °F (36.9 °C)  Pulse:  [58-80] 66  Resp:  [14-20] 16  SpO2:  [94 %-98 %] 98 %  BP: (112-143)/(55-62) 112/55       6/20 6/21 6/22   WBC 10.59 13.59 (H) 9.91     Lactate, Devante 2.6 (H) 2.7 (H) 1.3      Blood culture: NGTD     Urine Culture: Escherichia coli  ED, Dr. Downey, 6/20                 H&P, Dr. Yadav/Dr. Dueñas,  6/20                      , Dr. Yadav/Dr. Dueñas, 6/21                                      , Dr. Boucher/Dr. Dueñas, 6/22                                        Lab 6/20- 6/22       Lab 6/20     Lab 6/20     Lab 6/20                                                                                  Please clarify the diagnosis of Sepsis:    Provider Use Only  [ x ] Sepsis ruled in and additional clinical support/ decision making indicators for the diagnosis include (specify):___SIRS criteria as already documented______    [  ]  Sepsis has been ruled out    [  ] Other clarification (specify): ______________    [  ] Clinically undetermined             Present on admission (POA) status:   [  x ] Yes (Y)                          [  ] Clinically Undetermined (W)  [   ] No (N)                            [   ] Documentation insufficient to determine if condition is POA (U)

## 2020-06-29 NOTE — PT/OT/SLP DISCHARGE
Occupational Therapy Discharge Summary    Asaf Pendleton  MRN: [id number]   Principal Problem: Sepsis      Patient Discharged from acute Occupational Therapy on 6/22/2020.  Please refer to prior OT note dated 6/22/2020 for functional status.    Assessment:      Goals partially met.    Objective:     GOALS:   Multidisciplinary Problems     Occupational Therapy Goals        Problem: Occupational Therapy Goal    Goal Priority Disciplines Outcome Interventions   Occupational Therapy Goal     OT, PT/OT Ongoing, Progressing    Description: Goals to be met by: 6/28/20    Patient will increase functional independence with ADLs by performing:    UE Dressing with Stand-by Assistance.  LE Dressing with Minimal Assistance.  Grooming while seated with Minimal Assistance.  Toileting from bedside commode with Moderate Assistance for hygiene and clothing management.   Supine to sit with Minimal Assistance.  Toilet transfer to bedside commode with Contact Guard Assistance.                     Reasons for Discontinuation of Therapy Services  Transfer to alternate level of care.      Plan:     Patient Discharged to: Home with Home Health Service    Barbara Chaudhry OT  6/29/2020

## 2020-07-28 NOTE — TELEPHONE ENCOUNTER
Spoke with wife-tried to troubleshoot over phone and was unsuccessful. Patient was offered tomorrow afternoon appointment with Dwain Sheridan or Thursday afternoon appointment with me. Elected to come Thursday afternoon. Requested a new RC Dex, if possible.

## 2020-08-03 NOTE — PROGRESS NOTES
Asaf Pendleton seen today for a hearing aid check.  Pt complained that his  right hearing aid(s) was not working properly. Listening check confirmed that the aid was working well.   Aid(s) were cleaned and checked.  Wax guard(s) was replaced.  Otoscopy revealed that the right ear was completely occluded with cerumen.  Pts ear was cleaned today and he reported better sound with his hearing aids.  Pt was given a new Dex that was paired to both aids.  He will follow up as needed.

## 2020-08-03 NOTE — PROCEDURES
Ear Cerumen Removal    Date/Time: 8/3/2020 2:30 PM  Performed by: Stella Lopez NP  Authorized by: Stella Lopez NP     Consent Done?:  Yes (Verbal)  Location details:  Right ear  Procedure type: curette    Cerumen  Removal Results:  Cerumen completely removed  Patient tolerance:  Patient tolerated the procedure well with no immediate complications     Procedure Note:    Patient was brought to the minor procedure room and using the operating microscope of the right ear canal which was cleaned of ceruminous debris. There was a significant cerumen impaction.  Using a combination of suction, curettes and cup forceps the patient's cerumen impaction was removed. Tympanic membrane intact. Pt tolerated well. There were no complications.

## 2020-09-04 NOTE — PROGRESS NOTES
Patient's wife called requesting to order a new RC-DEX since patient lost the last one.  He also needs his hearing aids adjusted.  I will call him to schedule an appointment for both once the RC-DEX comes in.

## 2020-09-17 NOTE — PROGRESS NOTES
Subjective:       Patient ID: Asaf Pendleton is a 89 y.o. male      Chief Complaint   Patient presents with    6 mo NARMD OU w/ ADV. Atrophic w/ Subfoveal involvement     History of Present Illness  HPI     DLS 10/02/2017 by Dr. Madai MD    89 YO M Here today w/his wife ( Lorelei) w/ concern of vision is worse   since last visit and can't see to read.  Able to see in distance [place].     -Flashes/floaters  -eye pain   +blurred vision    Has eye vitamins at home but does not take them    POHx:   ARMD OU   Hemifacial spasm   S/p botox injections    Last edited by Terrence Aj MD on 9/16/2020  7:03 PM. (History)        Imaging:    See report    Assessment/Plan:     1. 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 Blindness and low vision  Not interested in going back to  at this time    Follow up in about 1 year (around 9/16/2021), or if symptoms worsen or fail to improve, for Comprehensive Examination, OCT Mac.

## 2020-10-08 NOTE — PROGRESS NOTES
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 This hearing aid is covered under LD warranty. We will order replacement. The patient is aware of the $400 deductible. When the hearing aid comes in, Dr. 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 At that appointment, he will pay for the RC Dex ($130+tax) and the LD deductible ($400). Dr. Pendleton was satisfied with the volume and sound quality of his left hearing aid at today's appointment. He was encouraged to call or message if he has any issues prior to hearing from us when his devices come in.

## 2020-10-14 NOTE — PROGRESS NOTES
Patient's L&D has returned from the .  He is scheduled to see Dr. Farrell on 10/15/2020 at 2:30 PM.    WIDEX EVOKE 440 E-IP, Full Shell, Beige  SN: [id number]

## 2020-10-15 NOTE — PROGRESS NOTES
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 Upon inspection, it was determined that we received a replacement left hearing aid. This will be sent back to [facility] and the LD warranty will be reinstated for the LHA. The correct RHA replacement will be obtained. Will call patient when ANDERSON comes in. He was counseled to bring  dex to that appointment so it can be paired with both hearing aids.

## 2020-11-16 NOTE — TELEPHONE ENCOUNTER
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 I found time on 12-16-20 at 130pm. I will mail a appointment slip to Dr Pendleton's home. Kristy GARG

## 2020-11-16 NOTE — TELEPHONE ENCOUNTER
----- Message from Lora Duncan sent at 11/16/2020 10:15 AM CST -----  Asaf M McKinnon calling regarding Patient Advice (message) want to speak with Dr. Aguirre. he stated he worked with him and have questions. [phone]

## 2020-11-25 NOTE — PROCEDURES
Ear Cerumen Removal    Date/Time: 11/25/2020 1:00 PM  Performed by: Stella Lopez NP  Authorized by: Stella Lopez NP     Consent Done?:  Yes (Verbal)  Location details:  Right ear  Procedure type: curette    Cerumen  Removal Results:  Cerumen completely removed  Patient tolerance:  Patient tolerated the procedure well with no immediate complications     Procedure Note:    Patient was brought to the minor procedure room and using the operating microscope of the right ear canal which was cleaned of ceruminous debris. There was a significant cerumen impaction.  Using a combination of suction, curettes and cup forceps the patient's cerumen impaction was removed. Tympanic membrane intact. Pt tolerated well. There were no complications.

## 2020-11-25 NOTE — PROGRESS NOTES
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 Hearing aids were programmed together and paired with a stock RC Dex, as the patient no longer has the RC Dex that was recently dispensed. 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 He was encouraged to be very careful with his devices around his animals, as they tend to eat them. He was also encouraged to call or message if he has any issues/needs additional adjustments.     Right Hearing Aid Information:   Widex Evoke 440 E-IP, Full Shell, Beige   Rt SN [id number]   Warranty expiration: 1/17/23    L and D expiration: used on 10/16/20     RC DEX  SN: [id number]

## 2020-12-16 NOTE — TELEPHONE ENCOUNTER
----- Message from Hector Cleary sent at 12/16/2020 11:14 AM CST -----  Regarding: resched.  Contact: [phone]  Pts wife is calling to cancel appt and resched for a later day and time. Please contact pts wife to resched. Pts wife can be reached @[phone]

## 2020-12-16 NOTE — TELEPHONE ENCOUNTER
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 He can not make it today and needs a late afternoon appointment in January. I let them know the January schedule is not open yet but will be soon and I will mail a appointment to their home They were happy with this. Kristy Villa LPN
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
## 2020-12-31 PROBLEM — Z96.649 PERIPROSTHETIC FRACTURE OF SHAFT OF FEMUR: Status: ACTIVE | Noted: 2020-01-01

## 2020-12-31 PROBLEM — M97.8XXA PERIPROSTHETIC FRACTURE OF SHAFT OF FEMUR: Status: ACTIVE | Noted: 2020-01-01

## 2021-01-01 ENCOUNTER — HOSPITAL ENCOUNTER (INPATIENT)
Facility: HOSPITAL | Age: 86
LOS: 1 days | DRG: 871 | End: 2021-01-12
Attending: EMERGENCY MEDICINE | Admitting: HOSPITALIST
Payer: MEDICARE

## 2021-01-01 ENCOUNTER — EXTERNAL HOSPITAL ADMISSION (OUTPATIENT)
Dept: SKILLED NURSING FACILITY | Facility: HOSPITAL | Age: 86
End: 2021-01-01
Payer: MEDICARE

## 2021-01-01 VITALS
DIASTOLIC BLOOD PRESSURE: 77 MMHG | WEIGHT: 199.94 LBS | BODY MASS INDEX: 27.08 KG/M2 | HEART RATE: 76 BPM | HEIGHT: 72 IN | TEMPERATURE: 99 F | OXYGEN SATURATION: 96 % | RESPIRATION RATE: 19 BRPM | SYSTOLIC BLOOD PRESSURE: 170 MMHG

## 2021-01-01 VITALS
TEMPERATURE: 97 F | SYSTOLIC BLOOD PRESSURE: 81 MMHG | RESPIRATION RATE: 22 BRPM | HEIGHT: 71 IN | OXYGEN SATURATION: 53 % | BODY MASS INDEX: 28 KG/M2 | WEIGHT: 200 LBS | HEART RATE: 43 BPM | DIASTOLIC BLOOD PRESSURE: 41 MMHG

## 2021-01-01 DIAGNOSIS — R41.82 ALTERED MENTAL STATUS, UNSPECIFIED ALTERED MENTAL STATUS TYPE: ICD-10-CM

## 2021-01-01 DIAGNOSIS — K66.8 PNEUMOPERITONEUM OF UNKNOWN ETIOLOGY: Primary | ICD-10-CM

## 2021-01-01 DIAGNOSIS — Z20.822 ENCOUNTER FOR LABORATORY TESTING FOR COVID-19 VIRUS: ICD-10-CM

## 2021-01-01 DIAGNOSIS — R06.02 SHORTNESS OF BREATH: Primary | ICD-10-CM

## 2021-01-01 DIAGNOSIS — N17.9 ACUTE KIDNEY INJURY: ICD-10-CM

## 2021-01-01 DIAGNOSIS — R06.02 SHORTNESS OF BREATH: ICD-10-CM

## 2021-01-01 PROBLEM — A41.9 SEPSIS: Status: RESOLVED | Noted: 2020-01-01 | Resolved: 2021-01-01

## 2021-01-01 PROBLEM — R41.0 DELIRIUM: Status: RESOLVED | Noted: 2020-01-01 | Resolved: 2021-01-01

## 2021-01-01 PROBLEM — D62 ACUTE BLOOD LOSS AS CAUSE OF POSTOPERATIVE ANEMIA: Status: ACTIVE | Noted: 2021-01-01

## 2021-01-01 PROBLEM — D53.9 MACROCYTIC ANEMIA: Status: ACTIVE | Noted: 2021-01-01

## 2021-01-01 PROBLEM — Z75.8 DISCHARGE PLANNING ISSUES: Status: RESOLVED | Noted: 2020-01-01 | Resolved: 2021-01-01

## 2021-01-01 PROBLEM — G93.40 ACUTE ENCEPHALOPATHY: Status: ACTIVE | Noted: 2021-01-01

## 2021-01-01 PROBLEM — H54.10 BLINDNESS AND LOW VISION: Status: RESOLVED | Noted: 2019-01-02 | Resolved: 2021-01-01

## 2021-01-01 PROBLEM — N18.9 ACUTE KIDNEY INJURY SUPERIMPOSED ON CKD: Status: ACTIVE | Noted: 2021-01-01

## 2021-01-01 PROBLEM — H50.40: Status: RESOLVED | Noted: 2017-03-01 | Resolved: 2021-01-01

## 2021-01-01 PROBLEM — D69.6 THROMBOCYTOPENIA: Status: ACTIVE | Noted: 2021-01-01

## 2021-01-01 LAB
ABO + RH BLD: NORMAL
ALBUMIN SERPL BCP-MCNC: 2 G/DL (ref 3.5–5.2)
ALBUMIN SERPL BCP-MCNC: 2.1 G/DL (ref 3.5–5.2)
ALBUMIN SERPL BCP-MCNC: 2.6 G/DL (ref 3.5–5.2)
ALLENS TEST: ABNORMAL
ALP SERPL-CCNC: 35 U/L (ref 55–135)
ALP SERPL-CCNC: 50 U/L (ref 55–135)
ALP SERPL-CCNC: 69 U/L (ref 55–135)
ALT SERPL W/O P-5'-P-CCNC: 11 U/L (ref 10–44)
ALT SERPL W/O P-5'-P-CCNC: 7 U/L (ref 10–44)
ALT SERPL W/O P-5'-P-CCNC: 7 U/L (ref 10–44)
ANION GAP SERPL CALC-SCNC: 10 MMOL/L (ref 8–16)
ANION GAP SERPL CALC-SCNC: 11 MMOL/L (ref 8–16)
ANION GAP SERPL CALC-SCNC: 12 MMOL/L (ref 8–16)
ANION GAP SERPL CALC-SCNC: 15 MMOL/L (ref 8–16)
ANION GAP SERPL CALC-SCNC: 17 MMOL/L (ref 8–16)
ANION GAP SERPL CALC-SCNC: 8 MMOL/L (ref 8–16)
ANION GAP SERPL CALC-SCNC: 8 MMOL/L (ref 8–16)
ANION GAP SERPL CALC-SCNC: 9 MMOL/L (ref 8–16)
ANISOCYTOSIS BLD QL SMEAR: SLIGHT
ANISOCYTOSIS BLD QL SMEAR: SLIGHT
AST SERPL-CCNC: 22 U/L (ref 10–40)
AST SERPL-CCNC: 25 U/L (ref 10–40)
AST SERPL-CCNC: 26 U/L (ref 10–40)
BACTERIA #/AREA URNS AUTO: ABNORMAL /HPF
BACTERIA #/AREA URNS AUTO: ABNORMAL /HPF
BACTERIA UR CULT: NO GROWTH
BASOPHILS # BLD AUTO: 0.01 K/UL (ref 0–0.2)
BASOPHILS # BLD AUTO: 0.02 K/UL (ref 0–0.2)
BASOPHILS # BLD AUTO: 0.02 K/UL (ref 0–0.2)
BASOPHILS # BLD AUTO: 0.03 K/UL (ref 0–0.2)
BASOPHILS NFR BLD: 0.1 % (ref 0–1.9)
BASOPHILS NFR BLD: 0.2 % (ref 0–1.9)
BILIRUB SERPL-MCNC: 0.3 MG/DL (ref 0.1–1)
BILIRUB SERPL-MCNC: 0.8 MG/DL (ref 0.1–1)
BILIRUB SERPL-MCNC: 0.9 MG/DL (ref 0.1–1)
BILIRUB UR QL STRIP: NEGATIVE
BILIRUB UR QL STRIP: NEGATIVE
BLD GP AB SCN CELLS X3 SERPL QL: NORMAL
BLD PROD TYP BPU: NORMAL
BLOOD UNIT EXPIRATION DATE: NORMAL
BLOOD UNIT TYPE CODE: 9500
BLOOD UNIT TYPE: NORMAL
BUN SERPL-MCNC: 115 MG/DL (ref 10–30)
BUN SERPL-MCNC: 118 MG/DL (ref 6–30)
BUN SERPL-MCNC: 42 MG/DL (ref 10–30)
BUN SERPL-MCNC: 56 MG/DL (ref 10–30)
BUN SERPL-MCNC: 62 MG/DL (ref 10–30)
BUN SERPL-MCNC: 68 MG/DL (ref 10–30)
BUN SERPL-MCNC: 70 MG/DL (ref 10–30)
BURR CELLS BLD QL SMEAR: ABNORMAL
CALCIUM SERPL-MCNC: 7.5 MG/DL (ref 8.7–10.5)
CALCIUM SERPL-MCNC: 7.6 MG/DL (ref 8.7–10.5)
CALCIUM SERPL-MCNC: 7.6 MG/DL (ref 8.7–10.5)
CALCIUM SERPL-MCNC: 7.8 MG/DL (ref 8.7–10.5)
CALCIUM SERPL-MCNC: 7.9 MG/DL (ref 8.7–10.5)
CALCIUM SERPL-MCNC: 8 MG/DL (ref 8.7–10.5)
CALCIUM SERPL-MCNC: 8.4 MG/DL (ref 8.7–10.5)
CALCIUM SERPL-MCNC: 8.7 MG/DL (ref 8.7–10.5)
CHLORIDE SERPL-SCNC: 112 MMOL/L (ref 95–110)
CHLORIDE SERPL-SCNC: 113 MMOL/L (ref 95–110)
CHLORIDE SERPL-SCNC: 114 MMOL/L (ref 95–110)
CHLORIDE SERPL-SCNC: 115 MMOL/L (ref 95–110)
CHLORIDE SERPL-SCNC: 115 MMOL/L (ref 95–110)
CHLORIDE SERPL-SCNC: 116 MMOL/L (ref 95–110)
CK SERPL-CCNC: 1807 U/L (ref 20–200)
CLARITY UR REFRACT.AUTO: ABNORMAL
CLARITY UR REFRACT.AUTO: ABNORMAL
CO2 SERPL-SCNC: 14 MMOL/L (ref 23–29)
CO2 SERPL-SCNC: 15 MMOL/L (ref 23–29)
CO2 SERPL-SCNC: 18 MMOL/L (ref 23–29)
CO2 SERPL-SCNC: 18 MMOL/L (ref 23–29)
CO2 SERPL-SCNC: 19 MMOL/L (ref 23–29)
CO2 SERPL-SCNC: 20 MMOL/L (ref 23–29)
CO2 SERPL-SCNC: 20 MMOL/L (ref 23–29)
CO2 SERPL-SCNC: 22 MMOL/L (ref 23–29)
CODING SYSTEM: NORMAL
COLOR UR AUTO: YELLOW
COLOR UR AUTO: YELLOW
CREAT SERPL-MCNC: 2.3 MG/DL (ref 0.5–1.4)
CREAT SERPL-MCNC: 2.4 MG/DL (ref 0.5–1.4)
CREAT SERPL-MCNC: 2.4 MG/DL (ref 0.5–1.4)
CREAT SERPL-MCNC: 2.6 MG/DL (ref 0.5–1.4)
CREAT SERPL-MCNC: 2.6 MG/DL (ref 0.5–1.4)
CREAT SERPL-MCNC: 2.7 MG/DL (ref 0.5–1.4)
CREAT SERPL-MCNC: 3.1 MG/DL (ref 0.5–1.4)
CREAT SERPL-MCNC: 4.3 MG/DL (ref 0.5–1.4)
CREAT SERPL-MCNC: 4.5 MG/DL (ref 0.5–1.4)
CREAT UR-MCNC: 144 MG/DL (ref 23–375)
CTP QC/QA: YES
DACRYOCYTES BLD QL SMEAR: ABNORMAL
DELSYS: ABNORMAL
DIFFERENTIAL METHOD: ABNORMAL
DISPENSE STATUS: NORMAL
EOSINOPHIL # BLD AUTO: 0 K/UL (ref 0–0.5)
EOSINOPHIL # BLD AUTO: 0.1 K/UL (ref 0–0.5)
EOSINOPHIL # BLD AUTO: 0.2 K/UL (ref 0–0.5)
EOSINOPHIL # BLD AUTO: 0.3 K/UL (ref 0–0.5)
EOSINOPHIL # BLD AUTO: 0.4 K/UL (ref 0–0.5)
EOSINOPHIL NFR BLD: 0 % (ref 0–8)
EOSINOPHIL NFR BLD: 0.8 % (ref 0–8)
EOSINOPHIL NFR BLD: 1.2 % (ref 0–8)
EOSINOPHIL NFR BLD: 2.6 % (ref 0–8)
EOSINOPHIL NFR BLD: 2.6 % (ref 0–8)
ERYTHROCYTE [DISTWIDTH] IN BLOOD BY AUTOMATED COUNT: 17.3 % (ref 11.5–14.5)
ERYTHROCYTE [DISTWIDTH] IN BLOOD BY AUTOMATED COUNT: 17.4 % (ref 11.5–14.5)
ERYTHROCYTE [DISTWIDTH] IN BLOOD BY AUTOMATED COUNT: 17.5 % (ref 11.5–14.5)
ERYTHROCYTE [DISTWIDTH] IN BLOOD BY AUTOMATED COUNT: 17.6 % (ref 11.5–14.5)
ERYTHROCYTE [DISTWIDTH] IN BLOOD BY AUTOMATED COUNT: 18 % (ref 11.5–14.5)
ERYTHROCYTE [SEDIMENTATION RATE] IN BLOOD BY WESTERGREN METHOD: 23 MM/H
EST. GFR  (AFRICAN AMERICAN): 12.5 ML/MIN/1.73 M^2
EST. GFR  (AFRICAN AMERICAN): 18.6 ML/MIN/1.73 M^2
EST. GFR  (AFRICAN AMERICAN): 22 ML/MIN/1.73 M^2
EST. GFR  (AFRICAN AMERICAN): 23 ML/MIN/1.73 M^2
EST. GFR  (AFRICAN AMERICAN): 23 ML/MIN/1.73 M^2
EST. GFR  (AFRICAN AMERICAN): 25.4 ML/MIN/1.73 M^2
EST. GFR  (AFRICAN AMERICAN): 25.4 ML/MIN/1.73 M^2
EST. GFR  (AFRICAN AMERICAN): 26.7 ML/MIN/1.73 M^2
EST. GFR  (NON AFRICAN AMERICAN): 10.8 ML/MIN/1.73 M^2
EST. GFR  (NON AFRICAN AMERICAN): 16.1 ML/MIN/1.73 M^2
EST. GFR  (NON AFRICAN AMERICAN): 19 ML/MIN/1.73 M^2
EST. GFR  (NON AFRICAN AMERICAN): 19.9 ML/MIN/1.73 M^2
EST. GFR  (NON AFRICAN AMERICAN): 19.9 ML/MIN/1.73 M^2
EST. GFR  (NON AFRICAN AMERICAN): 21.9 ML/MIN/1.73 M^2
EST. GFR  (NON AFRICAN AMERICAN): 21.9 ML/MIN/1.73 M^2
EST. GFR  (NON AFRICAN AMERICAN): 23.1 ML/MIN/1.73 M^2
FIO2: 100
FLOW: 10
GLUCOSE SERPL-MCNC: 107 MG/DL (ref 70–110)
GLUCOSE SERPL-MCNC: 108 MG/DL (ref 70–110)
GLUCOSE SERPL-MCNC: 118 MG/DL (ref 70–110)
GLUCOSE SERPL-MCNC: 118 MG/DL (ref 70–110)
GLUCOSE SERPL-MCNC: 130 MG/DL (ref 70–110)
GLUCOSE SERPL-MCNC: 139 MG/DL (ref 70–110)
GLUCOSE SERPL-MCNC: 162 MG/DL (ref 70–110)
GLUCOSE SERPL-MCNC: 187 MG/DL (ref 70–110)
GLUCOSE SERPL-MCNC: 193 MG/DL (ref 70–110)
GLUCOSE UR QL STRIP: NEGATIVE
GLUCOSE UR QL STRIP: NEGATIVE
HCO3 UR-SCNC: 12.5 MMOL/L (ref 24–28)
HCT VFR BLD AUTO: 22.9 % (ref 40–54)
HCT VFR BLD AUTO: 24 % (ref 40–54)
HCT VFR BLD AUTO: 24.4 % (ref 40–54)
HCT VFR BLD AUTO: 26.6 % (ref 40–54)
HCT VFR BLD AUTO: 27.4 % (ref 40–54)
HCT VFR BLD AUTO: 32.5 % (ref 40–54)
HCT VFR BLD AUTO: 33.6 % (ref 40–54)
HCT VFR BLD AUTO: 39.5 % (ref 40–54)
HCT VFR BLD CALC: 28 %PCV (ref 36–54)
HGB BLD-MCNC: 10.2 G/DL (ref 14–18)
HGB BLD-MCNC: 11.7 G/DL (ref 14–18)
HGB BLD-MCNC: 7.1 G/DL (ref 14–18)
HGB BLD-MCNC: 7.5 G/DL (ref 14–18)
HGB BLD-MCNC: 7.6 G/DL (ref 14–18)
HGB BLD-MCNC: 8.4 G/DL (ref 14–18)
HGB BLD-MCNC: 8.8 G/DL (ref 14–18)
HGB BLD-MCNC: 9.8 G/DL (ref 14–18)
HGB UR QL STRIP: ABNORMAL
HGB UR QL STRIP: ABNORMAL
HYALINE CASTS UR QL AUTO: 0 /LPF
HYALINE CASTS UR QL AUTO: 0 /LPF
HYPOCHROMIA BLD QL SMEAR: ABNORMAL
IMM GRANULOCYTES # BLD AUTO: 0.03 K/UL (ref 0–0.04)
IMM GRANULOCYTES # BLD AUTO: 0.04 K/UL (ref 0–0.04)
IMM GRANULOCYTES # BLD AUTO: 0.05 K/UL (ref 0–0.04)
IMM GRANULOCYTES # BLD AUTO: 0.07 K/UL (ref 0–0.04)
IMM GRANULOCYTES # BLD AUTO: 0.09 K/UL (ref 0–0.04)
IMM GRANULOCYTES # BLD AUTO: 0.19 K/UL (ref 0–0.04)
IMM GRANULOCYTES # BLD AUTO: 0.23 K/UL (ref 0–0.04)
IMM GRANULOCYTES # BLD AUTO: 0.27 K/UL (ref 0–0.04)
IMM GRANULOCYTES NFR BLD AUTO: 0.3 % (ref 0–0.5)
IMM GRANULOCYTES NFR BLD AUTO: 0.3 % (ref 0–0.5)
IMM GRANULOCYTES NFR BLD AUTO: 0.4 % (ref 0–0.5)
IMM GRANULOCYTES NFR BLD AUTO: 0.5 % (ref 0–0.5)
IMM GRANULOCYTES NFR BLD AUTO: 0.5 % (ref 0–0.5)
IMM GRANULOCYTES NFR BLD AUTO: 1.4 % (ref 0–0.5)
IMM GRANULOCYTES NFR BLD AUTO: 1.8 % (ref 0–0.5)
IMM GRANULOCYTES NFR BLD AUTO: 1.9 % (ref 0–0.5)
KETONES UR QL STRIP: NEGATIVE
KETONES UR QL STRIP: NEGATIVE
LACTATE SERPL-SCNC: 7.4 MMOL/L (ref 0.5–2.2)
LEUKOCYTE ESTERASE UR QL STRIP: ABNORMAL
LEUKOCYTE ESTERASE UR QL STRIP: ABNORMAL
LYMPHOCYTES # BLD AUTO: 0.7 K/UL (ref 1–4.8)
LYMPHOCYTES # BLD AUTO: 1 K/UL (ref 1–4.8)
LYMPHOCYTES # BLD AUTO: 1.1 K/UL (ref 1–4.8)
LYMPHOCYTES # BLD AUTO: 1.1 K/UL (ref 1–4.8)
LYMPHOCYTES # BLD AUTO: 1.5 K/UL (ref 1–4.8)
LYMPHOCYTES NFR BLD: 10.7 % (ref 18–48)
LYMPHOCYTES NFR BLD: 5.5 % (ref 18–48)
LYMPHOCYTES NFR BLD: 7.1 % (ref 18–48)
LYMPHOCYTES NFR BLD: 7.5 % (ref 18–48)
LYMPHOCYTES NFR BLD: 7.7 % (ref 18–48)
LYMPHOCYTES NFR BLD: 7.7 % (ref 18–48)
LYMPHOCYTES NFR BLD: 7.8 % (ref 18–48)
LYMPHOCYTES NFR BLD: 9.3 % (ref 18–48)
MAGNESIUM SERPL-MCNC: 2.4 MG/DL (ref 1.6–2.6)
MCH RBC QN AUTO: 31.3 PG (ref 27–31)
MCH RBC QN AUTO: 31.5 PG (ref 27–31)
MCH RBC QN AUTO: 31.7 PG (ref 27–31)
MCH RBC QN AUTO: 31.7 PG (ref 27–31)
MCH RBC QN AUTO: 31.8 PG (ref 27–31)
MCH RBC QN AUTO: 31.8 PG (ref 27–31)
MCH RBC QN AUTO: 32 PG (ref 27–31)
MCH RBC QN AUTO: 32.2 PG (ref 27–31)
MCHC RBC AUTO-ENTMCNC: 29.2 G/DL (ref 32–36)
MCHC RBC AUTO-ENTMCNC: 29.6 G/DL (ref 32–36)
MCHC RBC AUTO-ENTMCNC: 30.7 G/DL (ref 32–36)
MCHC RBC AUTO-ENTMCNC: 31 G/DL (ref 32–36)
MCHC RBC AUTO-ENTMCNC: 31.1 G/DL (ref 32–36)
MCHC RBC AUTO-ENTMCNC: 31.3 G/DL (ref 32–36)
MCHC RBC AUTO-ENTMCNC: 31.4 G/DL (ref 32–36)
MCHC RBC AUTO-ENTMCNC: 33.1 G/DL (ref 32–36)
MCV RBC AUTO: 101 FL (ref 82–98)
MCV RBC AUTO: 102 FL (ref 82–98)
MCV RBC AUTO: 103 FL (ref 82–98)
MCV RBC AUTO: 107 FL (ref 82–98)
MCV RBC AUTO: 108 FL (ref 82–98)
MCV RBC AUTO: 97 FL (ref 82–98)
MICROSCOPIC COMMENT: ABNORMAL
MICROSCOPIC COMMENT: ABNORMAL
MODE: ABNORMAL
MONOCYTES # BLD AUTO: 0.5 K/UL (ref 0.3–1)
MONOCYTES # BLD AUTO: 0.7 K/UL (ref 0.3–1)
MONOCYTES # BLD AUTO: 1.1 K/UL (ref 0.3–1)
MONOCYTES # BLD AUTO: 1.4 K/UL (ref 0.3–1)
MONOCYTES # BLD AUTO: 1.6 K/UL (ref 0.3–1)
MONOCYTES # BLD AUTO: 2 K/UL (ref 0.3–1)
MONOCYTES NFR BLD: 10.1 % (ref 4–15)
MONOCYTES NFR BLD: 10.9 % (ref 4–15)
MONOCYTES NFR BLD: 12.3 % (ref 4–15)
MONOCYTES NFR BLD: 12.6 % (ref 4–15)
MONOCYTES NFR BLD: 2.9 % (ref 4–15)
MONOCYTES NFR BLD: 8.9 % (ref 4–15)
MONOCYTES NFR BLD: 9.6 % (ref 4–15)
MONOCYTES NFR BLD: 9.8 % (ref 4–15)
NEUTROPHILS # BLD AUTO: 10.3 K/UL (ref 1.8–7.7)
NEUTROPHILS # BLD AUTO: 10.6 K/UL (ref 1.8–7.7)
NEUTROPHILS # BLD AUTO: 10.6 K/UL (ref 1.8–7.7)
NEUTROPHILS # BLD AUTO: 10.9 K/UL (ref 1.8–7.7)
NEUTROPHILS # BLD AUTO: 12.3 K/UL (ref 1.8–7.7)
NEUTROPHILS # BLD AUTO: 16.2 K/UL (ref 1.8–7.7)
NEUTROPHILS # BLD AUTO: 5.9 K/UL (ref 1.8–7.7)
NEUTROPHILS # BLD AUTO: 9.9 K/UL (ref 1.8–7.7)
NEUTROPHILS NFR BLD: 76.5 % (ref 38–73)
NEUTROPHILS NFR BLD: 76.7 % (ref 38–73)
NEUTROPHILS NFR BLD: 78 % (ref 38–73)
NEUTROPHILS NFR BLD: 79.6 % (ref 38–73)
NEUTROPHILS NFR BLD: 79.8 % (ref 38–73)
NEUTROPHILS NFR BLD: 79.9 % (ref 38–73)
NEUTROPHILS NFR BLD: 82.8 % (ref 38–73)
NEUTROPHILS NFR BLD: 90.9 % (ref 38–73)
NITRITE UR QL STRIP: NEGATIVE
NITRITE UR QL STRIP: NEGATIVE
NRBC BLD-RTO: 0 /100 WBC
NRBC BLD-RTO: 1 /100 WBC
NUM UNITS TRANS PACKED RBC: NORMAL
OVALOCYTES BLD QL SMEAR: ABNORMAL
PCO2 BLDA: 20.6 MMHG (ref 35–45)
PH SMN: 7.39 [PH] (ref 7.35–7.45)
PH UR STRIP: 5 [PH] (ref 5–8)
PH UR STRIP: 8 [PH] (ref 5–8)
PHOSPHATE SERPL-MCNC: 3.7 MG/DL (ref 2.7–4.5)
PHOSPHATE SERPL-MCNC: 5.7 MG/DL (ref 2.7–4.5)
PLATELET # BLD AUTO: 137 K/UL (ref 150–350)
PLATELET # BLD AUTO: 167 K/UL (ref 150–350)
PLATELET # BLD AUTO: 195 K/UL (ref 150–350)
PLATELET # BLD AUTO: 312 K/UL (ref 150–350)
PLATELET # BLD AUTO: 76 K/UL (ref 150–350)
PLATELET # BLD AUTO: 78 K/UL (ref 150–350)
PLATELET # BLD AUTO: 86 K/UL (ref 150–350)
PLATELET # BLD AUTO: 95 K/UL (ref 150–350)
PLATELET BLD QL SMEAR: ABNORMAL
PLATELET BLD QL SMEAR: ABNORMAL
PMV BLD AUTO: 10 FL (ref 9.2–12.9)
PMV BLD AUTO: 10.2 FL (ref 9.2–12.9)
PMV BLD AUTO: 10.6 FL (ref 9.2–12.9)
PMV BLD AUTO: 10.7 FL (ref 9.2–12.9)
PMV BLD AUTO: 9.3 FL (ref 9.2–12.9)
PMV BLD AUTO: 9.5 FL (ref 9.2–12.9)
PMV BLD AUTO: 9.6 FL (ref 9.2–12.9)
PMV BLD AUTO: 9.8 FL (ref 9.2–12.9)
PO2 BLDA: 142 MMHG (ref 80–100)
POC BE: -12 MMOL/L
POC IONIZED CALCIUM: 0.98 MMOL/L (ref 1.06–1.42)
POC SATURATED O2: 99 % (ref 95–100)
POC TCO2 (MEASURED): 17 MMOL/L (ref 23–29)
POC TCO2: 13 MMOL/L (ref 23–27)
POIKILOCYTOSIS BLD QL SMEAR: SLIGHT
POLYCHROMASIA BLD QL SMEAR: ABNORMAL
POLYCHROMASIA BLD QL SMEAR: ABNORMAL
POTASSIUM BLD-SCNC: 5.1 MMOL/L (ref 3.5–5.1)
POTASSIUM SERPL-SCNC: 4.1 MMOL/L (ref 3.5–5.1)
POTASSIUM SERPL-SCNC: 4.2 MMOL/L (ref 3.5–5.1)
POTASSIUM SERPL-SCNC: 4.3 MMOL/L (ref 3.5–5.1)
POTASSIUM SERPL-SCNC: 4.4 MMOL/L (ref 3.5–5.1)
POTASSIUM SERPL-SCNC: 4.5 MMOL/L (ref 3.5–5.1)
POTASSIUM SERPL-SCNC: 6.3 MMOL/L (ref 3.5–5.1)
PROT SERPL-MCNC: 5.1 G/DL (ref 6–8.4)
PROT SERPL-MCNC: 5.5 G/DL (ref 6–8.4)
PROT SERPL-MCNC: 6.2 G/DL (ref 6–8.4)
PROT UR QL STRIP: ABNORMAL
PROT UR QL STRIP: ABNORMAL
RBC # BLD AUTO: 2.22 M/UL (ref 4.6–6.2)
RBC # BLD AUTO: 2.36 M/UL (ref 4.6–6.2)
RBC # BLD AUTO: 2.39 M/UL (ref 4.6–6.2)
RBC # BLD AUTO: 2.68 M/UL (ref 4.6–6.2)
RBC # BLD AUTO: 2.73 M/UL (ref 4.6–6.2)
RBC # BLD AUTO: 3.11 M/UL (ref 4.6–6.2)
RBC # BLD AUTO: 3.22 M/UL (ref 4.6–6.2)
RBC # BLD AUTO: 3.69 M/UL (ref 4.6–6.2)
RBC #/AREA URNS AUTO: 1 /HPF (ref 0–4)
RBC #/AREA URNS AUTO: 18 /HPF (ref 0–4)
SAMPLE: ABNORMAL
SAMPLE: ABNORMAL
SARS-COV-2 RDRP RESP QL NAA+PROBE: NEGATIVE
SARS-COV-2 RNA RESP QL NAA+PROBE: NOT DETECTED
SITE: ABNORMAL
SODIUM BLD-SCNC: 146 MMOL/L (ref 136–145)
SODIUM SERPL-SCNC: 140 MMOL/L (ref 136–145)
SODIUM SERPL-SCNC: 141 MMOL/L (ref 136–145)
SODIUM SERPL-SCNC: 141 MMOL/L (ref 136–145)
SODIUM SERPL-SCNC: 143 MMOL/L (ref 136–145)
SODIUM SERPL-SCNC: 144 MMOL/L (ref 136–145)
SODIUM SERPL-SCNC: 144 MMOL/L (ref 136–145)
SODIUM SERPL-SCNC: 145 MMOL/L (ref 136–145)
SODIUM SERPL-SCNC: 146 MMOL/L (ref 136–145)
SODIUM UR-SCNC: 25 MMOL/L (ref 20–250)
SP GR UR STRIP: 1.01 (ref 1–1.03)
SP GR UR STRIP: 1.02 (ref 1–1.03)
SP02: 100
SQUAMOUS #/AREA URNS AUTO: 0 /HPF
TB INDURATION 48 - 72 HR READ: 0 MM
TROPONIN I SERPL DL<=0.01 NG/ML-MCNC: 0.14 NG/ML (ref 0–0.03)
URN SPEC COLLECT METH UR: ABNORMAL
URN SPEC COLLECT METH UR: ABNORMAL
UUN UR-MCNC: 726 MG/DL (ref 140–1050)
WBC # BLD AUTO: 12.76 K/UL (ref 3.9–12.7)
WBC # BLD AUTO: 12.88 K/UL (ref 3.9–12.7)
WBC # BLD AUTO: 12.89 K/UL (ref 3.9–12.7)
WBC # BLD AUTO: 13.9 K/UL (ref 3.9–12.7)
WBC # BLD AUTO: 14.03 K/UL (ref 3.9–12.7)
WBC # BLD AUTO: 15.45 K/UL (ref 3.9–12.7)
WBC # BLD AUTO: 17.77 K/UL (ref 3.9–12.7)
WBC # BLD AUTO: 7.45 K/UL (ref 3.9–12.7)
WBC #/AREA URNS AUTO: 14 /HPF (ref 0–5)
WBC #/AREA URNS AUTO: 70 /HPF (ref 0–5)
YEAST UR QL AUTO: ABNORMAL

## 2021-01-01 PROCEDURE — 94640 AIRWAY INHALATION TREATMENT: CPT | Mod: HCNC

## 2021-01-01 PROCEDURE — 25000242 PHARM REV CODE 250 ALT 637 W/ HCPCS: Mod: HCNC | Performed by: ORTHOPAEDIC SURGERY

## 2021-01-01 PROCEDURE — 94761 N-INVAS EAR/PLS OXIMETRY MLT: CPT | Mod: HCNC

## 2021-01-01 PROCEDURE — 25000003 PHARM REV CODE 250: Mod: HCNC | Performed by: STUDENT IN AN ORGANIZED HEALTH CARE EDUCATION/TRAINING PROGRAM

## 2021-01-01 PROCEDURE — 99232 PR SUBSEQUENT HOSPITAL CARE,LEVL II: ICD-10-PCS | Mod: HCNC,,, | Performed by: HOSPITALIST

## 2021-01-01 PROCEDURE — 99232 PR SUBSEQUENT HOSPITAL CARE,LEVL II: ICD-10-PCS | Mod: HCNC,,, | Performed by: INTERNAL MEDICINE

## 2021-01-01 PROCEDURE — 99900035 HC TECH TIME PER 15 MIN (STAT): Mod: HCNC

## 2021-01-01 PROCEDURE — 25000003 PHARM REV CODE 250: Mod: HCNC | Performed by: INTERNAL MEDICINE

## 2021-01-01 PROCEDURE — 11000001 HC ACUTE MED/SURG PRIVATE ROOM: Mod: HCNC

## 2021-01-01 PROCEDURE — 82570 ASSAY OF URINE CREATININE: CPT | Mod: HCNC

## 2021-01-01 PROCEDURE — 80048 BASIC METABOLIC PNL TOTAL CA: CPT | Mod: HCNC

## 2021-01-01 PROCEDURE — 97530 THERAPEUTIC ACTIVITIES: CPT | Mod: HCNC

## 2021-01-01 PROCEDURE — 63600175 PHARM REV CODE 636 W HCPCS: Mod: HCNC | Performed by: ORTHOPAEDIC SURGERY

## 2021-01-01 PROCEDURE — 63600175 PHARM REV CODE 636 W HCPCS: Mod: HCNC | Performed by: EMERGENCY MEDICINE

## 2021-01-01 PROCEDURE — 36415 COLL VENOUS BLD VENIPUNCTURE: CPT | Mod: HCNC

## 2021-01-01 PROCEDURE — 97535 SELF CARE MNGMENT TRAINING: CPT | Mod: HCNC

## 2021-01-01 PROCEDURE — 85025 COMPLETE CBC W/AUTO DIFF WBC: CPT | Mod: HCNC

## 2021-01-01 PROCEDURE — 84100 ASSAY OF PHOSPHORUS: CPT | Mod: HCNC

## 2021-01-01 PROCEDURE — P9022 WASHED RED BLOOD CELLS UNIT: HCPCS | Mod: HCNC

## 2021-01-01 PROCEDURE — 83735 ASSAY OF MAGNESIUM: CPT

## 2021-01-01 PROCEDURE — 99291 CRITICAL CARE FIRST HOUR: CPT | Mod: 25

## 2021-01-01 PROCEDURE — 99291 PR CRITICAL CARE, E/M 30-74 MINUTES: ICD-10-PCS | Mod: HCNC,,, | Performed by: EMERGENCY MEDICINE

## 2021-01-01 PROCEDURE — 86580 TB INTRADERMAL TEST: CPT | Mod: HCNC | Performed by: ORTHOPAEDIC SURGERY

## 2021-01-01 PROCEDURE — 25000003 PHARM REV CODE 250: Mod: HCNC | Performed by: ORTHOPAEDIC SURGERY

## 2021-01-01 PROCEDURE — 99223 PR INITIAL HOSPITAL CARE,LEVL III: ICD-10-PCS | Mod: HCNC,AI,, | Performed by: INTERNAL MEDICINE

## 2021-01-01 PROCEDURE — 87088 URINE BACTERIA CULTURE: CPT

## 2021-01-01 PROCEDURE — 99232 SBSQ HOSP IP/OBS MODERATE 35: CPT | Mod: HCNC,,, | Performed by: INTERNAL MEDICINE

## 2021-01-01 PROCEDURE — 63600175 PHARM REV CODE 636 W HCPCS: Performed by: STUDENT IN AN ORGANIZED HEALTH CARE EDUCATION/TRAINING PROGRAM

## 2021-01-01 PROCEDURE — 25000242 PHARM REV CODE 250 ALT 637 W/ HCPCS: Mod: HCNC | Performed by: STUDENT IN AN ORGANIZED HEALTH CARE EDUCATION/TRAINING PROGRAM

## 2021-01-01 PROCEDURE — 30200315 PPD INTRADERMAL TEST REV CODE 302: Mod: HCNC | Performed by: ORTHOPAEDIC SURGERY

## 2021-01-01 PROCEDURE — 97162 PT EVAL MOD COMPLEX 30 MIN: CPT | Mod: HCNC

## 2021-01-01 PROCEDURE — 96366 THER/PROPH/DIAG IV INF ADDON: CPT

## 2021-01-01 PROCEDURE — 25000003 PHARM REV CODE 250: Performed by: EMERGENCY MEDICINE

## 2021-01-01 PROCEDURE — 63600175 PHARM REV CODE 636 W HCPCS: Mod: HCNC | Performed by: INTERNAL MEDICINE

## 2021-01-01 PROCEDURE — U0003 INFECTIOUS AGENT DETECTION BY NUCLEIC ACID (DNA OR RNA); SEVERE ACUTE RESPIRATORY SYNDROME CORONAVIRUS 2 (SARS-COV-2) (CORONAVIRUS DISEASE [COVID-19]), AMPLIFIED PROBE TECHNIQUE, MAKING USE OF HIGH THROUGHPUT TECHNOLOGIES AS DESCRIBED BY CMS-2020-01-R: HCPCS | Mod: HCNC

## 2021-01-01 PROCEDURE — 83605 ASSAY OF LACTIC ACID: CPT

## 2021-01-01 PROCEDURE — 86900 BLOOD TYPING SEROLOGIC ABO: CPT | Mod: HCNC

## 2021-01-01 PROCEDURE — 63600175 PHARM REV CODE 636 W HCPCS: Mod: HCNC | Performed by: STUDENT IN AN ORGANIZED HEALTH CARE EDUCATION/TRAINING PROGRAM

## 2021-01-01 PROCEDURE — 25000242 PHARM REV CODE 250 ALT 637 W/ HCPCS: Mod: HCNC | Performed by: INTERNAL MEDICINE

## 2021-01-01 PROCEDURE — 81001 URINALYSIS AUTO W/SCOPE: CPT

## 2021-01-01 PROCEDURE — 82803 BLOOD GASES ANY COMBINATION: CPT | Mod: HCNC

## 2021-01-01 PROCEDURE — 99232 SBSQ HOSP IP/OBS MODERATE 35: CPT | Mod: HCNC,,, | Performed by: HOSPITALIST

## 2021-01-01 PROCEDURE — 12000002 HC ACUTE/MED SURGE SEMI-PRIVATE ROOM

## 2021-01-01 PROCEDURE — 96374 THER/PROPH/DIAG INJ IV PUSH: CPT | Mod: 59

## 2021-01-01 PROCEDURE — 84484 ASSAY OF TROPONIN QUANT: CPT

## 2021-01-01 PROCEDURE — 80053 COMPREHEN METABOLIC PANEL: CPT

## 2021-01-01 PROCEDURE — 86920 COMPATIBILITY TEST SPIN: CPT | Mod: HCNC

## 2021-01-01 PROCEDURE — 82550 ASSAY OF CK (CPK): CPT | Mod: HCNC

## 2021-01-01 PROCEDURE — 80053 COMPREHEN METABOLIC PANEL: CPT | Mod: HCNC

## 2021-01-01 PROCEDURE — 87086 URINE CULTURE/COLONY COUNT: CPT

## 2021-01-01 PROCEDURE — 87040 BLOOD CULTURE FOR BACTERIA: CPT | Mod: 59

## 2021-01-01 PROCEDURE — 25000003 PHARM REV CODE 250: Mod: HCNC | Performed by: EMERGENCY MEDICINE

## 2021-01-01 PROCEDURE — 97166 OT EVAL MOD COMPLEX 45 MIN: CPT | Mod: HCNC

## 2021-01-01 PROCEDURE — 87086 URINE CULTURE/COLONY COUNT: CPT | Mod: HCNC

## 2021-01-01 PROCEDURE — 80047 BASIC METABLC PNL IONIZED CA: CPT

## 2021-01-01 PROCEDURE — 84540 ASSAY OF URINE/UREA-N: CPT | Mod: HCNC

## 2021-01-01 PROCEDURE — 87186 SC STD MICRODIL/AGAR DIL: CPT

## 2021-01-01 PROCEDURE — 36430 TRANSFUSION BLD/BLD COMPNT: CPT

## 2021-01-01 PROCEDURE — 84300 ASSAY OF URINE SODIUM: CPT | Mod: HCNC

## 2021-01-01 PROCEDURE — 96365 THER/PROPH/DIAG IV INF INIT: CPT

## 2021-01-01 PROCEDURE — 84100 ASSAY OF PHOSPHORUS: CPT

## 2021-01-01 PROCEDURE — 96361 HYDRATE IV INFUSION ADD-ON: CPT | Mod: 59

## 2021-01-01 PROCEDURE — 99223 1ST HOSP IP/OBS HIGH 75: CPT | Mod: HCNC,AI,, | Performed by: INTERNAL MEDICINE

## 2021-01-01 PROCEDURE — 85025 COMPLETE CBC W/AUTO DIFF WBC: CPT

## 2021-01-01 PROCEDURE — 99291 CRITICAL CARE FIRST HOUR: CPT | Mod: HCNC,,, | Performed by: EMERGENCY MEDICINE

## 2021-01-01 PROCEDURE — 87077 CULTURE AEROBIC IDENTIFY: CPT | Mod: 59

## 2021-01-01 PROCEDURE — 81001 URINALYSIS AUTO W/SCOPE: CPT | Mod: HCNC

## 2021-01-01 PROCEDURE — U0002 COVID-19 LAB TEST NON-CDC: HCPCS | Mod: HCNC | Performed by: EMERGENCY MEDICINE

## 2021-01-01 PROCEDURE — 36600 WITHDRAWAL OF ARTERIAL BLOOD: CPT | Mod: HCNC

## 2021-01-01 RX ORDER — FUROSEMIDE 10 MG/ML
40 INJECTION INTRAMUSCULAR; INTRAVENOUS ONCE
Status: COMPLETED | OUTPATIENT
Start: 2021-01-01 | End: 2021-01-01

## 2021-01-01 RX ORDER — METHOCARBAMOL 500 MG/1
500 TABLET, FILM COATED ORAL 3 TIMES DAILY PRN
Qty: 21 TABLET | Refills: 0 | Status: SHIPPED | OUTPATIENT
Start: 2021-01-01 | End: 2021-01-01 | Stop reason: SDUPTHER

## 2021-01-01 RX ORDER — ONDANSETRON 2 MG/ML
8 INJECTION INTRAMUSCULAR; INTRAVENOUS EVERY 6 HOURS PRN
Status: DISCONTINUED | OUTPATIENT
Start: 2021-01-01 | End: 2021-01-01 | Stop reason: HOSPADM

## 2021-01-01 RX ORDER — SODIUM CHLORIDE 450 MG/100ML
INJECTION, SOLUTION INTRAVENOUS CONTINUOUS
Status: ACTIVE | OUTPATIENT
Start: 2021-01-01 | End: 2021-01-01

## 2021-01-01 RX ORDER — ATROPINE SULFATE 10 MG/ML
2 SOLUTION/ DROPS OPHTHALMIC EVERY 4 HOURS PRN
Status: DISCONTINUED | OUTPATIENT
Start: 2021-01-01 | End: 2021-01-01 | Stop reason: HOSPADM

## 2021-01-01 RX ORDER — MORPHINE SULFATE 4 MG/ML
4 INJECTION, SOLUTION INTRAMUSCULAR; INTRAVENOUS
Status: DISCONTINUED | OUTPATIENT
Start: 2021-01-01 | End: 2021-01-01 | Stop reason: HOSPADM

## 2021-01-01 RX ORDER — CELECOXIB 200 MG/1
200 CAPSULE ORAL DAILY
Qty: 7 CAPSULE | Refills: 0 | Status: SHIPPED | OUTPATIENT
Start: 2021-01-01 | End: 2021-01-14

## 2021-01-01 RX ORDER — FENTANYL CITRATE 50 UG/ML
50 INJECTION, SOLUTION INTRAMUSCULAR; INTRAVENOUS
Status: COMPLETED | OUTPATIENT
Start: 2021-01-01 | End: 2021-01-01

## 2021-01-01 RX ORDER — SCOLOPAMINE TRANSDERMAL SYSTEM 1 MG/1
1 PATCH, EXTENDED RELEASE TRANSDERMAL
Status: DISCONTINUED | OUTPATIENT
Start: 2021-01-01 | End: 2021-01-01 | Stop reason: HOSPADM

## 2021-01-01 RX ORDER — OXYCODONE HYDROCHLORIDE 5 MG/1
5 TABLET ORAL EVERY 6 HOURS PRN
Qty: 42 TABLET | Refills: 0 | Status: SHIPPED | OUTPATIENT
Start: 2021-01-01

## 2021-01-01 RX ORDER — DEXTROMETHORPHAN HYDROBROMIDE, GUAIFENESIN 5; 100 MG/5ML; MG/5ML
650 LIQUID ORAL EVERY 8 HOURS
Qty: 120 TABLET | Refills: 0 | Status: SHIPPED | OUTPATIENT
Start: 2021-01-01 | End: 2021-01-01 | Stop reason: SDUPTHER

## 2021-01-01 RX ORDER — LORAZEPAM 1 MG/1
1 TABLET ORAL EVERY 30 MIN PRN
Status: DISCONTINUED | OUTPATIENT
Start: 2021-01-01 | End: 2021-01-01

## 2021-01-01 RX ORDER — ALBUTEROL SULFATE 2.5 MG/.5ML
2.5 SOLUTION RESPIRATORY (INHALATION) EVERY 6 HOURS PRN
Status: DISCONTINUED | OUTPATIENT
Start: 2021-01-01 | End: 2021-01-01 | Stop reason: HOSPADM

## 2021-01-01 RX ORDER — NOREPINEPHRINE BITARTRATE/D5W 4MG/250ML
PLASTIC BAG, INJECTION (ML) INTRAVENOUS
Status: DISCONTINUED
Start: 2021-01-01 | End: 2021-01-01 | Stop reason: HOSPADM

## 2021-01-01 RX ORDER — TALC
6 POWDER (GRAM) TOPICAL NIGHTLY PRN
Status: DISCONTINUED | OUTPATIENT
Start: 2021-01-01 | End: 2021-01-01

## 2021-01-01 RX ORDER — ATROPINE SULFATE 10 MG/ML
1 SOLUTION/ DROPS OPHTHALMIC 3 TIMES DAILY
Status: DISCONTINUED | OUTPATIENT
Start: 2021-01-01 | End: 2021-01-01 | Stop reason: SDUPTHER

## 2021-01-01 RX ORDER — HALOPERIDOL 5 MG/ML
1 INJECTION INTRAMUSCULAR EVERY 4 HOURS PRN
Status: DISCONTINUED | OUTPATIENT
Start: 2021-01-01 | End: 2021-01-01 | Stop reason: HOSPADM

## 2021-01-01 RX ORDER — TESTOSTERONE 20.25 MG/1.25G
GEL TOPICAL
Qty: 75 G | Refills: 5 | Status: SHIPPED | OUTPATIENT
Start: 2021-01-01

## 2021-01-01 RX ORDER — ACETAMINOPHEN 500 MG
1000 TABLET ORAL EVERY 8 HOURS
Status: DISCONTINUED | OUTPATIENT
Start: 2021-01-01 | End: 2021-01-01 | Stop reason: HOSPADM

## 2021-01-01 RX ORDER — DEXTROMETHORPHAN HYDROBROMIDE, GUAIFENESIN 5; 100 MG/5ML; MG/5ML
650 LIQUID ORAL EVERY 8 HOURS
Qty: 120 TABLET | Refills: 0 | Status: SHIPPED | OUTPATIENT
Start: 2021-01-01

## 2021-01-01 RX ORDER — ONDANSETRON 8 MG/1
8 TABLET, ORALLY DISINTEGRATING ORAL EVERY 6 HOURS PRN
Qty: 30 TABLET | Refills: 0 | Status: SHIPPED | OUTPATIENT
Start: 2021-01-01

## 2021-01-01 RX ORDER — OLANZAPINE 5 MG/1
5 TABLET, ORALLY DISINTEGRATING ORAL EVERY 8 HOURS PRN
Status: DISCONTINUED | OUTPATIENT
Start: 2021-01-01 | End: 2021-01-01 | Stop reason: HOSPADM

## 2021-01-01 RX ORDER — HYDROCODONE BITARTRATE AND ACETAMINOPHEN 500; 5 MG/1; MG/1
TABLET ORAL
Status: DISCONTINUED | OUTPATIENT
Start: 2021-01-01 | End: 2021-01-01 | Stop reason: HOSPADM

## 2021-01-01 RX ORDER — CELECOXIB 200 MG/1
200 CAPSULE ORAL DAILY
Qty: 7 CAPSULE | Refills: 0 | Status: SHIPPED | OUTPATIENT
Start: 2021-01-01 | End: 2021-01-01 | Stop reason: SDUPTHER

## 2021-01-01 RX ORDER — MORPHINE SULFATE 2 MG/ML
2 INJECTION, SOLUTION INTRAMUSCULAR; INTRAVENOUS EVERY 4 HOURS PRN
Status: DISCONTINUED | OUTPATIENT
Start: 2021-01-01 | End: 2021-01-01 | Stop reason: HOSPADM

## 2021-01-01 RX ORDER — LORAZEPAM 2 MG/ML
1 INJECTION INTRAMUSCULAR EVERY 30 MIN PRN
Status: DISCONTINUED | OUTPATIENT
Start: 2021-01-01 | End: 2021-01-01 | Stop reason: HOSPADM

## 2021-01-01 RX ORDER — SODIUM CHLORIDE 450 MG/100ML
INJECTION, SOLUTION INTRAVENOUS ONCE
Status: DISCONTINUED | OUTPATIENT
Start: 2021-01-01 | End: 2021-01-01

## 2021-01-01 RX ORDER — ALBUTEROL SULFATE 2.5 MG/.5ML
2.5 SOLUTION RESPIRATORY (INHALATION)
Status: DISCONTINUED | OUTPATIENT
Start: 2021-01-01 | End: 2021-01-01

## 2021-01-01 RX ORDER — METHOCARBAMOL 500 MG/1
500 TABLET, FILM COATED ORAL 3 TIMES DAILY PRN
Qty: 21 TABLET | Refills: 0 | Status: SHIPPED | OUTPATIENT
Start: 2021-01-01 | End: 2021-01-14

## 2021-01-01 RX ORDER — ONDANSETRON 2 MG/ML
8 INJECTION INTRAMUSCULAR; INTRAVENOUS EVERY 8 HOURS PRN
Status: DISCONTINUED | OUTPATIENT
Start: 2021-01-01 | End: 2021-01-01 | Stop reason: SDUPTHER

## 2021-01-01 RX ORDER — ATROPINE SULFATE 10 MG/ML
1 SOLUTION/ DROPS OPHTHALMIC 3 TIMES DAILY
Status: DISCONTINUED | OUTPATIENT
Start: 2021-01-01 | End: 2021-01-01

## 2021-01-01 RX ORDER — ONDANSETRON 8 MG/1
8 TABLET, ORALLY DISINTEGRATING ORAL EVERY 6 HOURS PRN
Qty: 30 TABLET | Refills: 0 | Status: SHIPPED | OUTPATIENT
Start: 2021-01-01 | End: 2021-01-01 | Stop reason: SDUPTHER

## 2021-01-01 RX ORDER — OXYCODONE HYDROCHLORIDE 5 MG/1
5 TABLET ORAL EVERY 6 HOURS PRN
Qty: 42 TABLET | Refills: 0 | Status: SHIPPED | OUTPATIENT
Start: 2021-01-01 | End: 2021-01-01 | Stop reason: SDUPTHER

## 2021-01-01 RX ORDER — GLYCOPYRROLATE 0.2 MG/ML
0.1 INJECTION INTRAMUSCULAR; INTRAVENOUS 3 TIMES DAILY PRN
Status: DISCONTINUED | OUTPATIENT
Start: 2021-01-01 | End: 2021-01-01 | Stop reason: HOSPADM

## 2021-01-01 RX ORDER — SODIUM CHLORIDE 450 MG/100ML
INJECTION, SOLUTION INTRAVENOUS CONTINUOUS
Status: DISCONTINUED | OUTPATIENT
Start: 2021-01-01 | End: 2021-01-01

## 2021-01-01 RX ADMIN — ALBUTEROL SULFATE 2.5 MG: 2.5 SOLUTION RESPIRATORY (INHALATION) at 02:01

## 2021-01-01 RX ADMIN — ALBUTEROL SULFATE 2.5 MG: 2.5 SOLUTION RESPIRATORY (INHALATION) at 07:01

## 2021-01-01 RX ADMIN — METHOCARBAMOL 500 MG: 500 TABLET ORAL at 09:01

## 2021-01-01 RX ADMIN — MORPHINE SULFATE 4 MG: 4 INJECTION INTRAVENOUS at 06:01

## 2021-01-01 RX ADMIN — FUROSEMIDE 40 MG: 10 INJECTION, SOLUTION INTRAMUSCULAR; INTRAVENOUS at 03:01

## 2021-01-01 RX ADMIN — POLYETHYLENE GLYCOL 3350 17 G: 17 POWDER, FOR SOLUTION ORAL at 09:01

## 2021-01-01 RX ADMIN — APIXABAN 2.5 MG: 2.5 TABLET, FILM COATED ORAL at 10:01

## 2021-01-01 RX ADMIN — ACETAMINOPHEN 1000 MG: 500 TABLET ORAL at 02:01

## 2021-01-01 RX ADMIN — MORPHINE SULFATE 4 MG: 4 INJECTION INTRAVENOUS at 07:01

## 2021-01-01 RX ADMIN — DOCUSATE SODIUM AND SENNOSIDES 1 TABLET: 8.6; 5 TABLET, FILM COATED ORAL at 08:01

## 2021-01-01 RX ADMIN — DOCUSATE SODIUM AND SENNOSIDES 1 TABLET: 8.6; 5 TABLET, FILM COATED ORAL at 09:01

## 2021-01-01 RX ADMIN — MORPHINE SULFATE 4 MG: 4 INJECTION INTRAVENOUS at 03:01

## 2021-01-01 RX ADMIN — ALBUTEROL SULFATE 2.5 MG: 2.5 SOLUTION RESPIRATORY (INHALATION) at 04:01

## 2021-01-01 RX ADMIN — MUPIROCIN 1 G: 20 OINTMENT TOPICAL at 09:01

## 2021-01-01 RX ADMIN — ATROPINE SULFATE 1 DROP: 10 SOLUTION OPHTHALMIC at 05:01

## 2021-01-01 RX ADMIN — ALBUTEROL SULFATE 2.5 MG: 2.5 SOLUTION RESPIRATORY (INHALATION) at 03:01

## 2021-01-01 RX ADMIN — SODIUM CHLORIDE 500 ML: 0.9 INJECTION, SOLUTION INTRAVENOUS at 12:01

## 2021-01-01 RX ADMIN — ALBUTEROL SULFATE 2.5 MG: 2.5 SOLUTION RESPIRATORY (INHALATION) at 11:01

## 2021-01-01 RX ADMIN — FENTANYL CITRATE 50 MCG: 50 INJECTION INTRAMUSCULAR; INTRAVENOUS at 02:01

## 2021-01-01 RX ADMIN — ALBUTEROL SULFATE 2.5 MG: 2.5 SOLUTION RESPIRATORY (INHALATION) at 08:01

## 2021-01-01 RX ADMIN — APIXABAN 2.5 MG: 2.5 TABLET, FILM COATED ORAL at 09:01

## 2021-01-01 RX ADMIN — ALBUTEROL SULFATE 2.5 MG: 2.5 SOLUTION RESPIRATORY (INHALATION) at 05:01

## 2021-01-01 RX ADMIN — POLYETHYLENE GLYCOL 3350 17 G: 17 POWDER, FOR SOLUTION ORAL at 08:01

## 2021-01-01 RX ADMIN — OXYCODONE HYDROCHLORIDE 5 MG: 5 TABLET ORAL at 01:01

## 2021-01-01 RX ADMIN — OXYCODONE HYDROCHLORIDE 5 MG: 5 TABLET ORAL at 09:01

## 2021-01-01 RX ADMIN — ACETAMINOPHEN 1000 MG: 500 TABLET ORAL at 06:01

## 2021-01-01 RX ADMIN — SODIUM CHLORIDE 100 ML/HR: 0.45 INJECTION, SOLUTION INTRAVENOUS at 09:01

## 2021-01-01 RX ADMIN — ACETAMINOPHEN 1000 MG: 500 TABLET ORAL at 05:01

## 2021-01-01 RX ADMIN — ACETAMINOPHEN 1000 MG: 500 TABLET ORAL at 11:01

## 2021-01-01 RX ADMIN — METHOCARBAMOL 500 MG: 500 TABLET ORAL at 05:01

## 2021-01-01 RX ADMIN — ACETAMINOPHEN 1000 MG: 500 TABLET ORAL at 09:01

## 2021-01-01 RX ADMIN — APIXABAN 2.5 MG: 2.5 TABLET, FILM COATED ORAL at 08:01

## 2021-01-01 RX ADMIN — PIPERACILLIN AND TAZOBACTAM 4.5 G: 4; .5 INJECTION, POWDER, LYOPHILIZED, FOR SOLUTION INTRAVENOUS; PARENTERAL at 12:01

## 2021-01-01 RX ADMIN — DEXTROSE 1000 ML: 5 SOLUTION INTRAVENOUS at 11:01

## 2021-01-01 RX ADMIN — MUPIROCIN 1 G: 20 OINTMENT TOPICAL at 08:01

## 2021-01-01 RX ADMIN — SODIUM CHLORIDE 1000 ML: 0.45 INJECTION, SOLUTION INTRAVENOUS at 04:01

## 2021-01-01 RX ADMIN — LORAZEPAM 1 MG: 2 INJECTION INTRAMUSCULAR; INTRAVENOUS at 06:01

## 2021-01-01 RX ADMIN — SODIUM CHLORIDE 100 ML/HR: 0.45 INJECTION, SOLUTION INTRAVENOUS at 08:01

## 2021-01-01 RX ADMIN — SODIUM CHLORIDE 100 ML/HR: 0.45 INJECTION, SOLUTION INTRAVENOUS at 01:01

## 2021-01-01 RX ADMIN — ENOXAPARIN SODIUM 40 MG: 40 INJECTION SUBCUTANEOUS at 04:01

## 2021-01-01 RX ADMIN — SODIUM CHLORIDE, SODIUM LACTATE, POTASSIUM CHLORIDE, AND CALCIUM CHLORIDE 2721 ML: .6; .31; .03; .02 INJECTION, SOLUTION INTRAVENOUS at 12:01

## 2021-01-01 RX ADMIN — VANCOMYCIN HYDROCHLORIDE 2000 MG: 100 INJECTION, POWDER, LYOPHILIZED, FOR SOLUTION INTRAVENOUS at 01:01

## 2021-01-01 RX ADMIN — CEFAZOLIN 2 G: 330 INJECTION, POWDER, FOR SOLUTION INTRAMUSCULAR; INTRAVENOUS at 12:01

## 2021-01-01 RX ADMIN — OXYCODONE HYDROCHLORIDE 5 MG: 5 TABLET ORAL at 07:01

## 2021-01-01 RX ADMIN — ALBUTEROL SULFATE 2.5 MG: 2.5 SOLUTION RESPIRATORY (INHALATION) at 01:01

## 2021-01-01 RX ADMIN — ACETAMINOPHEN 1000 MG: 500 TABLET ORAL at 10:01

## 2021-01-01 RX ADMIN — OXYCODONE HYDROCHLORIDE 5 MG: 5 TABLET ORAL at 02:01

## 2021-01-01 RX ADMIN — CEFAZOLIN 2 G: 330 INJECTION, POWDER, FOR SOLUTION INTRAMUSCULAR; INTRAVENOUS at 08:01

## 2021-01-01 RX ADMIN — SODIUM CHLORIDE 100 ML/HR: 0.45 INJECTION, SOLUTION INTRAVENOUS at 05:01

## 2021-01-01 RX ADMIN — SODIUM CHLORIDE 1000 ML: 0.45 INJECTION, SOLUTION INTRAVENOUS at 11:01

## 2021-01-01 RX ADMIN — TUBERCULIN PURIFIED PROTEIN DERIVATIVE 5 UNITS: 5 INJECTION, SOLUTION INTRADERMAL at 12:01

## 2021-01-01 RX ADMIN — ACETAMINOPHEN 1000 MG: 500 TABLET ORAL at 01:01

## 2021-01-02 PROBLEM — E83.39 HYPOPHOSPHATEMIA: Status: RESOLVED | Noted: 2020-01-01 | Resolved: 2021-01-01

## 2021-01-12 PROBLEM — K66.8 PNEUMOPERITONEUM OF UNKNOWN ETIOLOGY: Status: ACTIVE | Noted: 2021-01-01

## 2021-01-12 PROBLEM — R06.02 SHORTNESS OF BREATH: Status: ACTIVE | Noted: 2021-01-01

## 2021-01-12 PROBLEM — Z51.5 COMFORT MEASURES ONLY STATUS: Status: ACTIVE | Noted: 2021-01-01

## 2021-01-15 LAB
BACTERIA BLD CULT: ABNORMAL
BACTERIA UR CULT: ABNORMAL

## 2022-10-17 NOTE — LETTER
January 25, 2019      Bala Bass MD  [address] [facility] [address]           [place] [facility]  [address] [facility]  [place] [address]  Phone: [phone]  Fax: [phone]          Patient: Asaf Pendleton   MR Number: [id number]   YOB: 1924   Date of Visit: 1/25/2019       Dear Dr. Bala Bass:    Thank you for referring Asaf Pendleton to me for evaluation. Attached you will find relevant portions of my assessment and plan of care.    If you have questions, please do not hesitate to call me. I look forward to following Asaf Pendleton along with you.    Sincerely,    Ronen Bernardo, OD    Enclosure  CC:  No Recipients    If you would like to receive this communication electronically, please contact [email or web address] or [phone] to request more information on [employer] access.    For providers and/or their staff who would like to refer a patient to [facility], please contact us through our one-stop-shop provider referral line, [facility], at [phone].    If you feel you have received this communication in error or would no longer like to receive these types of communications, please e-mail [email or web address]          Alternatives Discussed Intro (Do Not Add Period): I discussed alternative treatments to Mohs surgery and specifically discussed the risks and benefits of

## (undated) DEVICE — SEE MEDLINE ITEM 157131

## (undated) DEVICE — NDL 18GA X1 1/2 REG BEVEL

## (undated) DEVICE — DRESSING AQUACEL AG ADV 3.5X6

## (undated) DEVICE — SUT VICRYL PLUS 0 CT1 18IN

## (undated) DEVICE — PUMP COLD THERAPY

## (undated) DEVICE — SUT VICRYL PLUS 2-0 CT1 18

## (undated) DEVICE — DRAPE C-ARMOR EQUIPMENT COVER

## (undated) DEVICE — DRESSING TRANS 4X4 TEGADERM

## (undated) DEVICE — SEE MEDLINE ITEM 146298

## (undated) DEVICE — SUT MONOCRYL 3-0 PS-2 UND

## (undated) DEVICE — DRESSING AQUACEL AG ADV 3.5X12

## (undated) DEVICE — SYS CLSR DERMABOND PRINEO 22CM

## (undated) DEVICE — SYR 50CC LL

## (undated) DEVICE — GAUZE SPONGE 4X4 12PLY

## (undated) DEVICE — GLOVE BIOGEL PI MICRO INDIC 8

## (undated) DEVICE — PAD COLD THERAPY KNEE WRAP ON

## (undated) DEVICE — KIT TOTAL KNEE TKOFG

## (undated) DEVICE — SOL BETADINE 5%

## (undated) DEVICE — APPLICATOR CHLORAPREP ORN 26ML

## (undated) DEVICE — SET CABLE BEADED 2MM

## (undated) DEVICE — SOL IRR NACL .9% 3000ML

## (undated) DEVICE — BANDAGE ESMARK 6X12

## (undated) DEVICE — DRAPE PLASTIC U 60X72

## (undated) DEVICE — SEE MEDLINE ITEM 152622

## (undated) DEVICE — PRESSURIZER HI VAC HIP KIT

## (undated) DEVICE — ELECTRODE REM PLYHSV RETURN 9

## (undated) DEVICE — BLADE SAGITTAL 18 X 1.27 X 90M

## (undated) DEVICE — BOWL CEMENT

## (undated) DEVICE — DRAPE C-ARM ELAS CLIP 42X120IN

## (undated) DEVICE — Device

## (undated) DEVICE — SEE MEDLINE ITEM 152487

## (undated) DEVICE — KIT IRR SUCTION HND PIECE

## (undated) DEVICE — SET CEMENT (SCULP)

## (undated) DEVICE — GLOVE BIOGEL SKINSENSE PI 8.0

## (undated) DEVICE — MASK FLYTE HOOD PEEL AWAY

## (undated) DEVICE — SUT VICRYL+ 1 CT1 18IN

## (undated) DEVICE — ADHESIVE DERMABOND ADVANCED

## (undated) DEVICE — DRESSING AQUACEL AG 3.5X10IN

## (undated) DEVICE — TOURNIQUET SB QC DP 34X4IN